# Patient Record
Sex: MALE | Race: WHITE | NOT HISPANIC OR LATINO | Employment: OTHER | URBAN - METROPOLITAN AREA
[De-identification: names, ages, dates, MRNs, and addresses within clinical notes are randomized per-mention and may not be internally consistent; named-entity substitution may affect disease eponyms.]

---

## 2017-01-03 RX ORDER — PANTOPRAZOLE SODIUM 40 MG/1
TABLET, DELAYED RELEASE ORAL
Qty: 90 TABLET | Refills: 0 | Status: SHIPPED | OUTPATIENT
Start: 2017-01-03 | End: 2017-07-25 | Stop reason: SDUPTHER

## 2017-01-30 ENCOUNTER — OFFICE VISIT (OUTPATIENT)
Dept: GASTROENTEROLOGY | Facility: CLINIC | Age: 62
End: 2017-01-30

## 2017-01-30 VITALS
BODY MASS INDEX: 30.38 KG/M2 | WEIGHT: 212.2 LBS | SYSTOLIC BLOOD PRESSURE: 130 MMHG | HEIGHT: 70 IN | DIASTOLIC BLOOD PRESSURE: 82 MMHG

## 2017-01-30 DIAGNOSIS — K63.5 COLON POLYPS: ICD-10-CM

## 2017-01-30 DIAGNOSIS — Z80.0 FAMILY HISTORY OF GI MALIGNANCY: ICD-10-CM

## 2017-01-30 DIAGNOSIS — K21.9 GASTROESOPHAGEAL REFLUX DISEASE, ESOPHAGITIS PRESENCE NOT SPECIFIED: Primary | ICD-10-CM

## 2017-01-30 PROCEDURE — 99213 OFFICE O/P EST LOW 20 MIN: CPT | Performed by: INTERNAL MEDICINE

## 2017-01-30 RX ORDER — SODIUM CHLORIDE 0.9 % (FLUSH) 0.9 %
1-10 SYRINGE (ML) INJECTION AS NEEDED
Status: CANCELLED | OUTPATIENT
Start: 2017-01-30

## 2017-01-30 NOTE — MR AVS SNAPSHOT
Dangelo Song   1/30/2017 1:00 PM   Office Visit    Dept Phone:  [phone]   Encounter #:  [id number]    Provider:  Dangelo WHYTE MD   Department:  [facility] GASTROENTEROLOGY                Your Full Care Plan              Today's Medication Changes          These changes are accurate as of: 1/30/17  1:45 PM.  If you have any questions, ask your nurse or doctor.               Stop taking medication(s)listed here:     albuterol 108 (90 BASE) MCG/ACT inhaler   Commonly known as:  PROAIR RESPICLICK   Stopped by:  Dangelo WHYTE MD           benzonatate 100 MG capsule   Commonly known as:  TESSALON PERLES   Stopped by:  Dangelo WHYTE MD           levoFLOXacin 500 MG tablet   Commonly known as:  LEVAQUIN   Stopped by:  Dangelo WHYTE MD           predniSONE 10 MG tablet   Commonly known as:  DELTASONE   Stopped by:  Dangelo WHYTE MD                      Your Updated Medication List          This list is accurate as of: 1/30/17  1:45 PM.  Always use your most recent med list.                EPIPEN 2-CONSUELO 0.3 MG/0.3ML solution auto-injector injection   Generic drug:  EPINEPHrine       HYDROcodone-acetaminophen 5-325 MG per tablet   Commonly known as:  NORCO       lisinopril 5 MG tablet   Commonly known as:  PRINIVIL,ZESTRIL       pantoprazole 40 MG EC tablet   Commonly known as:  PROTONIX   Take 1 tablet by mouth  every day               You Were Diagnosed With        Codes Comments    Gastroesophageal reflux disease, esophagitis presence not specified    -  Primary ICD-10-CM: K21.9  ICD-9-CM: 530.81     Colon polyps     ICD-10-CM: K63.5  ICD-9-CM: 211.3     Family history of GI malignancy     ICD-10-CM: Z80.0  ICD-9-CM: V16.0       Instructions     None    Patient Instructions History      Upcoming Appointments     Visit Type Date Time Department    FOLLOW UP 1/30/2017  1:00 PM [facility]      MyChart Signup     [facility]  "allows you to send messages to your doctor, view your test results, renew your prescriptions, schedule appointments, and more. To sign up, go to [email or web address] and click on the Sign Up Now link in the New User? box. Enter your [employer] Activation Code exactly as it appears below along with the last four digits of your Social Security Number and your Date of Birth () to complete the sign-up process. If you do not sign up before the expiration date, you must request a new code.    [employer] Activation Code: [email or web address]  Expires: 2017  1:43 PM    If you have questions, you can email [email or web address] or call [phone] to talk to our [employer] staff. Remember, [employer] is NOT to be used for urgent needs. For medical emergencies, dial 911.               Other Info from Your Visit           Allergies     Bee Venom        Reason for Visit     Follow-up yearly,med refill,scopes      Vital Signs     Blood Pressure Height Weight Body Mass Index Smoking Status       130/82 70\" (177.8 cm) 212 lb 3.2 oz (96.3 kg) 30.45 kg/m2 Former Smoker       Problems and Diagnoses Noted     Acid reflux disease    -  Primary    Colon polyp        Family history of GI malignancy            "

## 2017-01-30 NOTE — PROGRESS NOTES
Chief Complaint   Patient presents with   • Follow-up     yearly,med refill,scopes        Dangelo Song is a  61 y.o. male here for a follow up visit for GERD, history of polyps, family history of colon cancer    HPI this 61-year-old white male returns in follow-up since his last visit of September 2015.  He was seen at that time for reflux issues and had been advised to call in follow-up to schedule upper endoscopy as his last study at been in 2012.  He also had undergone colonoscopy at that same time in 2012 with evidence of a hyperplastic polyp.  He is due for follow-up colonoscopy this year and has been advised to consider both upper and lower examination.  He reports problems with his left knee and is considering knee replacement in the near future.  I advised him to discuss this with his orthopedist as to the timing of his endoscopies and the need for any prophylactic antibiotic regimen.  His GI function is intact.  Pantoprazole controls his reflux.  Bowel pattern is usually 2-3 formed stools per day without any melena or bright red blood per rectum.  He does have a remote family history of colon cancer involving a cousin and had evidence of adenomatous polyps in the past.    Past Medical History   Diagnosis Date   • Acid reflux    • Hypertension        Current Outpatient Prescriptions   Medication Sig Dispense Refill   • EPIPEN 2-CONSUELO 0.3 MG/0.3ML solution auto-injector injection TAKE BY MOUTH AS DIRECTED  0   • HYDROcodone-acetaminophen (NORCO) 5-325 MG per tablet TAKE ONE TO TWO TABLETS BY MOUTH EVERY 6 TO 8 HOURS AS NEEDED FOR PAIN  0   • lisinopril (PRINIVIL,ZESTRIL) 5 MG tablet      • pantoprazole (PROTONIX) 40 MG EC tablet Take 1 tablet by mouth  every day 90 tablet 0     No current facility-administered medications for this visit.        PRN Meds:.    Allergies   Allergen Reactions   • Bee Venom        Social History     Social History   • Marital status:      Spouse name: N/A   • Number of  children: N/A   • Years of education: N/A     Occupational History   • Not on file.     Social History Main Topics   • Smoking status: Former Smoker   • Smokeless tobacco: Not on file   • Alcohol use Yes      Comment: occ.   • Drug use: Not on file   • Sexual activity: Not on file     Other Topics Concern   • Not on file     Social History Narrative   • No narrative on file       History reviewed. No pertinent family history.    Review of Systems   Constitutional: Negative for activity change, appetite change, fatigue and unexpected weight change.   HENT: Negative for congestion, facial swelling, sore throat, trouble swallowing and voice change.    Eyes: Negative for photophobia and visual disturbance.   Respiratory: Negative for cough and choking.    Cardiovascular: Negative for chest pain.   Gastrointestinal: Negative for abdominal distention, abdominal pain, anal bleeding, blood in stool, constipation, diarrhea, nausea, rectal pain and vomiting.   Endocrine: Negative for polyphagia.   Musculoskeletal: Negative for arthralgias, gait problem and joint swelling.        Left knee pain   Skin: Negative for color change, pallor and rash.   Allergic/Immunologic: Negative for food allergies.   Neurological: Negative for speech difficulty and headaches.   Hematological: Does not bruise/bleed easily.   Psychiatric/Behavioral: Negative for agitation, confusion and sleep disturbance.       Vitals:    01/30/17 1259   BP: 130/82       Physical Exam   Constitutional: He is oriented to person, place, and time. He appears well-developed and well-nourished.   HENT:   Head: Normocephalic.   Mouth/Throat: Oropharynx is clear and moist.   Eyes: Conjunctivae and EOM are normal.   Neck: Normal range of motion.   Cardiovascular: Normal rate and regular rhythm.    Pulmonary/Chest: Breath sounds normal.   Abdominal: Soft. Bowel sounds are normal.   Musculoskeletal: Normal range of motion.   Neurological: He is alert and oriented to  person, place, and time.   Skin: Skin is warm and dry.   Psychiatric: He has a normal mood and affect. His behavior is normal.       ASSESSMENT   #1 GERD: Stable on PPI  #2 remote family history of colon cancer  #3 personal history of colon polyps      PLAN  Offer EGD and colonoscopy  Patient to discuss timing with his orthopedist in view of his upcoming knee surgery  Continue current medications      ICD-10-CM ICD-9-CM   1. Gastroesophageal reflux disease, esophagitis presence not specified K21.9 530.81   2. Colon polyps K63.5 211.3   3. Family history of GI malignancy Z80.0 V16.0

## 2017-02-27 ENCOUNTER — HOSPITAL ENCOUNTER (OUTPATIENT)
Dept: CARDIOLOGY | Facility: HOSPITAL | Age: 62
Discharge: HOME | End: 2017-02-27
Attending: ORTHOPAEDIC SURGERY

## 2017-02-27 ENCOUNTER — TRANSCRIBE ORDERS (OUTPATIENT)
Dept: LAB | Facility: HOSPITAL | Age: 62
End: 2017-02-27

## 2017-02-27 ENCOUNTER — HOSPITAL ENCOUNTER (OUTPATIENT)
Dept: GENERAL RADIOLOGY | Facility: HOSPITAL | Age: 62
Discharge: HOME | End: 2017-02-27
Attending: ORTHOPAEDIC SURGERY | Admitting: ORTHOPAEDIC SURGERY

## 2017-02-27 ENCOUNTER — LAB (OUTPATIENT)
Dept: LAB | Facility: HOSPITAL | Age: 62
End: 2017-02-27
Attending: ORTHOPAEDIC SURGERY

## 2017-02-27 DIAGNOSIS — M25.562 LEFT KNEE PAIN, UNSPECIFIED CHRONICITY: ICD-10-CM

## 2017-02-27 DIAGNOSIS — M17.12 ARTHRITIS OF LEFT KNEE: ICD-10-CM

## 2017-02-27 DIAGNOSIS — M17.12 ARTHRITIS OF LEFT KNEE: Primary | ICD-10-CM

## 2017-02-27 DIAGNOSIS — M17.10 ARTHRITIS OF KNEE: ICD-10-CM

## 2017-02-27 LAB
ALBUMIN SERPL-MCNC: 4.6 G/DL (ref 3.5–5.2)
ALBUMIN/GLOB SERPL: 1.6 G/DL
ALP SERPL-CCNC: 54 U/L (ref 39–117)
ALT SERPL W P-5'-P-CCNC: 27 U/L (ref 1–41)
ANION GAP SERPL CALCULATED.3IONS-SCNC: 12.1 MMOL/L
APTT PPP: 31.7 SECONDS (ref 22.7–35.4)
AST SERPL-CCNC: 19 U/L (ref 1–40)
BASOPHILS # BLD AUTO: 0.02 10*3/MM3 (ref 0–0.2)
BASOPHILS NFR BLD AUTO: 0.2 % (ref 0–1.5)
BILIRUB SERPL-MCNC: 0.4 MG/DL (ref 0.1–1.2)
BILIRUB UR QL STRIP: NEGATIVE
BUN BLD-MCNC: 18 MG/DL (ref 8–23)
BUN/CREAT SERPL: 16.8 (ref 7–25)
CALCIUM SPEC-SCNC: 9.3 MG/DL (ref 8.6–10.5)
CHLORIDE SERPL-SCNC: 102 MMOL/L (ref 98–107)
CLARITY UR: CLEAR
CO2 SERPL-SCNC: 27.9 MMOL/L (ref 22–29)
COLOR UR: YELLOW
CREAT BLD-MCNC: 1.07 MG/DL (ref 0.76–1.27)
DEPRECATED RDW RBC AUTO: 40.5 FL (ref 37–54)
EOSINOPHIL # BLD AUTO: 0.04 10*3/MM3 (ref 0–0.7)
EOSINOPHIL NFR BLD AUTO: 0.4 % (ref 0.3–6.2)
ERYTHROCYTE [DISTWIDTH] IN BLOOD BY AUTOMATED COUNT: 12.6 % (ref 11.5–14.5)
GFR SERPL CREATININE-BSD FRML MDRD: 70 ML/MIN/1.73
GLOBULIN UR ELPH-MCNC: 2.8 GM/DL
GLUCOSE BLD-MCNC: 110 MG/DL (ref 65–99)
GLUCOSE UR STRIP-MCNC: NEGATIVE MG/DL
HCT VFR BLD AUTO: 45.5 % (ref 40.4–52.2)
HGB BLD-MCNC: 15.4 G/DL (ref 13.7–17.6)
HGB UR QL STRIP.AUTO: NEGATIVE
IMM GRANULOCYTES # BLD: 0.02 10*3/MM3 (ref 0–0.03)
IMM GRANULOCYTES NFR BLD: 0.2 % (ref 0–0.5)
KETONES UR QL STRIP: NEGATIVE
LEUKOCYTE ESTERASE UR QL STRIP.AUTO: NEGATIVE
LYMPHOCYTES # BLD AUTO: 1.8 10*3/MM3 (ref 0.9–4.8)
LYMPHOCYTES NFR BLD AUTO: 18.7 % (ref 19.6–45.3)
MCH RBC QN AUTO: 30 PG (ref 27–32.7)
MCHC RBC AUTO-ENTMCNC: 33.8 G/DL (ref 32.6–36.4)
MCV RBC AUTO: 88.7 FL (ref 79.8–96.2)
MONOCYTES # BLD AUTO: 0.73 10*3/MM3 (ref 0.2–1.2)
MONOCYTES NFR BLD AUTO: 7.6 % (ref 5–12)
NEUTROPHILS # BLD AUTO: 7.03 10*3/MM3 (ref 1.9–8.1)
NEUTROPHILS NFR BLD AUTO: 72.9 % (ref 42.7–76)
NITRITE UR QL STRIP: NEGATIVE
PH UR STRIP.AUTO: 5.5 [PH] (ref 5–8)
PLATELET # BLD AUTO: 236 10*3/MM3 (ref 140–500)
PMV BLD AUTO: 10.1 FL (ref 6–12)
POTASSIUM BLD-SCNC: 4.2 MMOL/L (ref 3.5–5.2)
PROT SERPL-MCNC: 7.4 G/DL (ref 6–8.5)
PROT UR QL STRIP: NEGATIVE
RBC # BLD AUTO: 5.13 10*6/MM3 (ref 4.6–6)
SODIUM BLD-SCNC: 142 MMOL/L (ref 136–145)
SP GR UR STRIP: 1.01 (ref 1–1.03)
UROBILINOGEN UR QL STRIP: NORMAL
WBC NRBC COR # BLD: 9.64 10*3/MM3 (ref 4.5–10.7)

## 2017-02-27 PROCEDURE — 71020 HC CHEST PA AND LATERAL: CPT

## 2017-02-27 PROCEDURE — 85730 THROMBOPLASTIN TIME PARTIAL: CPT

## 2017-02-27 PROCEDURE — 81003 URINALYSIS AUTO W/O SCOPE: CPT

## 2017-02-27 PROCEDURE — 93010 ELECTROCARDIOGRAM REPORT: CPT | Performed by: INTERNAL MEDICINE

## 2017-02-27 PROCEDURE — 85025 COMPLETE CBC W/AUTO DIFF WBC: CPT

## 2017-02-27 PROCEDURE — 93005 ELECTROCARDIOGRAM TRACING: CPT | Performed by: ORTHOPAEDIC SURGERY

## 2017-02-27 PROCEDURE — 80053 COMPREHEN METABOLIC PANEL: CPT

## 2017-02-27 PROCEDURE — 36415 COLL VENOUS BLD VENIPUNCTURE: CPT

## 2017-07-25 RX ORDER — PANTOPRAZOLE SODIUM 40 MG/1
TABLET, DELAYED RELEASE ORAL
Qty: 90 TABLET | Refills: 0 | Status: SHIPPED | OUTPATIENT
Start: 2017-07-25 | End: 2017-10-13 | Stop reason: SDUPTHER

## 2017-10-15 RX ORDER — PANTOPRAZOLE SODIUM 40 MG/1
TABLET, DELAYED RELEASE ORAL
Qty: 90 TABLET | Refills: 3 | Status: SHIPPED | OUTPATIENT
Start: 2017-10-15 | End: 2018-09-16 | Stop reason: SDUPTHER

## 2017-11-13 ENCOUNTER — TRANSCRIBE ORDERS (OUTPATIENT)
Dept: GASTROENTEROLOGY | Facility: CLINIC | Age: 62
End: 2017-11-13

## 2017-11-13 DIAGNOSIS — K22.70 BARRETT'S ESOPHAGUS WITHOUT DYSPLASIA: ICD-10-CM

## 2017-11-13 DIAGNOSIS — Z86.010 HX OF COLONIC POLYPS: Primary | ICD-10-CM

## 2017-12-21 ENCOUNTER — OUTSIDE FACILITY SERVICE (OUTPATIENT)
Dept: GASTROENTEROLOGY | Facility: CLINIC | Age: 62
End: 2017-12-21

## 2017-12-21 PROCEDURE — 43239 EGD BIOPSY SINGLE/MULTIPLE: CPT | Performed by: INTERNAL MEDICINE

## 2017-12-21 PROCEDURE — 45378 DIAGNOSTIC COLONOSCOPY: CPT | Performed by: INTERNAL MEDICINE

## 2017-12-28 ENCOUNTER — TELEPHONE (OUTPATIENT)
Dept: GASTROENTEROLOGY | Facility: CLINIC | Age: 62
End: 2017-12-28

## 2018-01-02 NOTE — TELEPHONE ENCOUNTER
Would recommend patient continue PPI to address his reflux, would follow-up in office annually or call sooner as needed.

## 2018-01-03 NOTE — TELEPHONE ENCOUNTER
Call to pt.  Advise per Dr Griffin that would recommend continue PPI to address reflux.  F/u in office annually or call sooner as needed.  Pt verb understanding.    O/v for 12/21/18 placed in recall.

## 2018-09-17 RX ORDER — PANTOPRAZOLE SODIUM 40 MG/1
TABLET, DELAYED RELEASE ORAL
Qty: 90 TABLET | Refills: 3 | Status: SHIPPED | OUTPATIENT
Start: 2018-09-17 | End: 2019-08-09 | Stop reason: SDUPTHER

## 2019-04-29 ENCOUNTER — TRANSCRIBE ORDERS (OUTPATIENT)
Dept: CARDIOLOGY | Facility: HOSPITAL | Age: 64
End: 2019-04-29

## 2019-04-29 ENCOUNTER — HOSPITAL ENCOUNTER (OUTPATIENT)
Dept: CARDIOLOGY | Facility: HOSPITAL | Age: 64
Discharge: HOME | End: 2019-04-29
Admitting: ORTHOPAEDIC SURGERY

## 2019-04-29 ENCOUNTER — LAB (OUTPATIENT)
Dept: LAB | Facility: HOSPITAL | Age: 64
End: 2019-04-29

## 2019-04-29 DIAGNOSIS — Z01.818 PRE-OP EXAM: ICD-10-CM

## 2019-04-29 DIAGNOSIS — Z01.818 PRE-OP EXAM: Primary | ICD-10-CM

## 2019-04-29 DIAGNOSIS — M25.561 RIGHT KNEE PAIN, UNSPECIFIED CHRONICITY: ICD-10-CM

## 2019-04-29 LAB
ALBUMIN SERPL-MCNC: 4.4 G/DL (ref 3.5–5.2)
ALBUMIN/GLOB SERPL: 1.8 G/DL
ALP SERPL-CCNC: 47 U/L (ref 39–117)
ALT SERPL W P-5'-P-CCNC: 31 U/L (ref 1–41)
ANION GAP SERPL CALCULATED.3IONS-SCNC: 11.4 MMOL/L
AST SERPL-CCNC: 30 U/L (ref 1–40)
BASOPHILS # BLD AUTO: 0.03 10*3/MM3 (ref 0–0.2)
BASOPHILS NFR BLD AUTO: 0.4 % (ref 0–1.5)
BILIRUB SERPL-MCNC: 0.7 MG/DL (ref 0.2–1.2)
BUN BLD-MCNC: 14 MG/DL (ref 8–23)
BUN/CREAT SERPL: 13.3 (ref 7–25)
CALCIUM SPEC-SCNC: 8.9 MG/DL (ref 8.6–10.5)
CHLORIDE SERPL-SCNC: 104 MMOL/L (ref 98–107)
CO2 SERPL-SCNC: 26.6 MMOL/L (ref 22–29)
CREAT BLD-MCNC: 1.05 MG/DL (ref 0.76–1.27)
DEPRECATED RDW RBC AUTO: 41.9 FL (ref 37–54)
EOSINOPHIL # BLD AUTO: 0.05 10*3/MM3 (ref 0–0.4)
EOSINOPHIL NFR BLD AUTO: 0.6 % (ref 0.3–6.2)
ERYTHROCYTE [DISTWIDTH] IN BLOOD BY AUTOMATED COUNT: 12.7 % (ref 12.3–15.4)
GFR SERPL CREATININE-BSD FRML MDRD: 72 ML/MIN/1.73
GLOBULIN UR ELPH-MCNC: 2.4 GM/DL
GLUCOSE BLD-MCNC: 162 MG/DL (ref 65–99)
HCT VFR BLD AUTO: 43.7 % (ref 37.5–51)
HGB BLD-MCNC: 14.6 G/DL (ref 13–17.7)
IMM GRANULOCYTES # BLD AUTO: 0.04 10*3/MM3 (ref 0–0.05)
IMM GRANULOCYTES NFR BLD AUTO: 0.5 % (ref 0–0.5)
LYMPHOCYTES # BLD AUTO: 1.11 10*3/MM3 (ref 0.7–3.1)
LYMPHOCYTES NFR BLD AUTO: 13.7 % (ref 19.6–45.3)
MCH RBC QN AUTO: 30.1 PG (ref 26.6–33)
MCHC RBC AUTO-ENTMCNC: 33.4 G/DL (ref 31.5–35.7)
MCV RBC AUTO: 90.1 FL (ref 79–97)
MONOCYTES # BLD AUTO: 0.57 10*3/MM3 (ref 0.1–0.9)
MONOCYTES NFR BLD AUTO: 7 % (ref 5–12)
NEUTROPHILS # BLD AUTO: 6.29 10*3/MM3 (ref 1.7–7)
NEUTROPHILS NFR BLD AUTO: 77.8 % (ref 42.7–76)
NRBC BLD AUTO-RTO: 0 /100 WBC (ref 0–0.2)
PLATELET # BLD AUTO: 171 10*3/MM3 (ref 140–450)
PMV BLD AUTO: 10.5 FL (ref 6–12)
POTASSIUM BLD-SCNC: 3.9 MMOL/L (ref 3.5–5.2)
PROT SERPL-MCNC: 6.8 G/DL (ref 6–8.5)
RBC # BLD AUTO: 4.85 10*6/MM3 (ref 4.14–5.8)
SODIUM BLD-SCNC: 142 MMOL/L (ref 136–145)
WBC NRBC COR # BLD: 8.09 10*3/MM3 (ref 3.4–10.8)

## 2019-04-29 PROCEDURE — 93010 ELECTROCARDIOGRAM REPORT: CPT | Performed by: INTERNAL MEDICINE

## 2019-04-29 PROCEDURE — 36415 COLL VENOUS BLD VENIPUNCTURE: CPT

## 2019-04-29 PROCEDURE — 80053 COMPREHEN METABOLIC PANEL: CPT

## 2019-04-29 PROCEDURE — 85025 COMPLETE CBC W/AUTO DIFF WBC: CPT

## 2019-04-29 PROCEDURE — 93005 ELECTROCARDIOGRAM TRACING: CPT | Performed by: ORTHOPAEDIC SURGERY

## 2019-08-11 RX ORDER — PANTOPRAZOLE SODIUM 40 MG/1
TABLET, DELAYED RELEASE ORAL
Qty: 90 TABLET | Refills: 3 | Status: SHIPPED | OUTPATIENT
Start: 2019-08-11 | End: 2020-06-17

## 2019-09-13 ENCOUNTER — OFFICE VISIT (OUTPATIENT)
Dept: GASTROENTEROLOGY | Facility: CLINIC | Age: 64
End: 2019-09-13

## 2019-09-13 VITALS
TEMPERATURE: 98.1 F | WEIGHT: 221.2 LBS | HEIGHT: 70 IN | BODY MASS INDEX: 31.67 KG/M2 | DIASTOLIC BLOOD PRESSURE: 82 MMHG | SYSTOLIC BLOOD PRESSURE: 134 MMHG

## 2019-09-13 DIAGNOSIS — R15.2 INCONTINENCE OF FECES WITH FECAL URGENCY: ICD-10-CM

## 2019-09-13 DIAGNOSIS — R10.12 LEFT UPPER QUADRANT PAIN: Primary | ICD-10-CM

## 2019-09-13 DIAGNOSIS — R19.7 DIARRHEA OF PRESUMED INFECTIOUS ORIGIN: ICD-10-CM

## 2019-09-13 DIAGNOSIS — R15.9 INCONTINENCE OF FECES WITH FECAL URGENCY: ICD-10-CM

## 2019-09-13 DIAGNOSIS — K21.9 GASTROESOPHAGEAL REFLUX DISEASE, ESOPHAGITIS PRESENCE NOT SPECIFIED: ICD-10-CM

## 2019-09-13 PROCEDURE — 99214 OFFICE O/P EST MOD 30 MIN: CPT | Performed by: NURSE PRACTITIONER

## 2019-09-13 RX ORDER — CIPROFLOXACIN 500 MG/1
TABLET, FILM COATED ORAL
Refills: 0 | COMMUNITY
Start: 2019-08-30 | End: 2019-10-16

## 2019-09-13 RX ORDER — ALPRAZOLAM 0.25 MG/1
TABLET ORAL
Refills: 0 | COMMUNITY
Start: 2019-08-23

## 2019-09-13 RX ORDER — RANITIDINE 150 MG/1
CAPSULE ORAL
Refills: 5 | COMMUNITY
Start: 2019-08-30 | End: 2019-10-16

## 2019-09-13 RX ORDER — SACCHAROMYCES BOULARDII 250 MG
250 CAPSULE ORAL 2 TIMES DAILY
COMMUNITY

## 2019-09-13 RX ORDER — TESTOSTERONE CYPIONATE 200 MG/ML
INJECTION, SOLUTION INTRAMUSCULAR
Refills: 0 | COMMUNITY
Start: 2019-08-06

## 2019-09-13 RX ORDER — LISINOPRIL AND HYDROCHLOROTHIAZIDE 12.5; 1 MG/1; MG/1
1 TABLET ORAL DAILY
COMMUNITY

## 2019-09-13 NOTE — PROGRESS NOTES
Chief Complaint   Patient presents with   • Abdominal Pain   • Diarrhea       Dangelo Song is a  63 y.o. male here for a follow up visit for abdominal pain and diarrhea.    HPI  63-year-old male presents today for follow-up visit for abdominal pain with diarrhea, nausea and fecal urgency.  He is a patient of Dr. Griffin.  He was last seen in the office on 1/30/2017.  He reports recently has been feeling terrible with abdominal pain, diarrhea, fecal urgency and nausea.  He admits he went to his primary care office And was given some Cipro and had labs and stool studies done he was told that the labs were negative and the stool studies were negative as well.  He has started on a probiotic recently.  Other than feeling a little bit of fatigue he tells me everything else is much better now.  He denies any reflux, dysphagia, abdominal pain, nausea and vomiting, diarrhea, constipation, rectal bleeding or melena.  Admits appetite is good and his weight is stable.  His last EGD and colonoscopy was done on 12/28/2017.  He does have a history of hyperplastic polyps.  Past Medical History:   Diagnosis Date   • Acid reflux    • Hypertension        Past Surgical History:   Procedure Laterality Date   • COLONOSCOPY  05/14/2012    ih,eh,sig tics,hp polyp   • COLONOSCOPY  12/28/2017    NTEH, tics, torts, IH   • ENDOSCOPY  05/14/2012    tort,hh,bilious gastric fluid,gastritis,zline irreg   • KNEE SURGERY     • UPPER GASTROINTESTINAL ENDOSCOPY  12/28/2017    gastritis   • WRIST SURGERY         Scheduled Meds:    Continuous Infusions:  No current facility-administered medications for this visit.     PRN Meds:.    Allergies   Allergen Reactions   • Bee Venom        Social History     Socioeconomic History   • Marital status:      Spouse name: Not on file   • Number of children: Not on file   • Years of education: Not on file   • Highest education level: Not on file   Tobacco Use   • Smoking status: Former Smoker     Types:  Cigarettes     Last attempt to quit:      Years since quittin.7   • Smokeless tobacco: Never Used   Substance and Sexual Activity   • Alcohol use: Yes     Comment: social   • Drug use: No       Family History   Problem Relation Age of Onset   • Breast cancer Mother    • Heart disease Father    • Colon cancer Cousin        Review of Systems   Constitutional: Negative for appetite change, chills, diaphoresis, fatigue, fever and unexpected weight change.   HENT: Negative for nosebleeds, postnasal drip, sore throat, trouble swallowing and voice change.    Respiratory: Negative for cough, choking, chest tightness, shortness of breath and wheezing.    Cardiovascular: Negative for chest pain, palpitations and leg swelling.   Gastrointestinal: Negative for abdominal distention, abdominal pain, anal bleeding, blood in stool, constipation, diarrhea, nausea, rectal pain and vomiting.   Endocrine: Negative for polydipsia, polyphagia and polyuria.   Musculoskeletal: Negative for gait problem.   Skin: Negative for rash and wound.   Allergic/Immunologic: Negative for food allergies.   Neurological: Negative for dizziness, speech difficulty and light-headedness.   Psychiatric/Behavioral: Negative for confusion, self-injury, sleep disturbance and suicidal ideas.       Vitals:    19 1507   BP: 134/82   Temp: 98.1 °F (36.7 °C)       Physical Exam   Constitutional: He is oriented to person, place, and time. He appears well-developed and well-nourished. He does not appear ill. No distress.   HENT:   Head: Normocephalic.   Eyes: Pupils are equal, round, and reactive to light.   Cardiovascular: Normal rate, regular rhythm and normal heart sounds.   Pulmonary/Chest: Effort normal and breath sounds normal.   Abdominal: Soft. Bowel sounds are normal. He exhibits no distension and no mass. There is no hepatosplenomegaly. There is no tenderness. There is no rebound and no guarding. No hernia.   Musculoskeletal: Normal range of  motion.   Neurological: He is alert and oriented to person, place, and time.   Skin: Skin is warm and dry.   Psychiatric: He has a normal mood and affect. His speech is normal and behavior is normal. Judgment normal.       No images are attached to the encounter.    Assessment and plan    1. Left upper quadrant pain    2. Diarrhea of presumed infectious origin    3. Incontinence of feces with fecal urgency    4. Gastroesophageal reflux disease, esophagitis presence not specified        I am not sure what is causing his symptoms at this time.  What ever it was seems to be much improved on the Cipro.  Patient to continue Cipro for now.  Patient to continue the Florastor.  Will obtain records from Dr. Peck's office to review.  Offered patient a CT scan of his abdomen and pelvis to further evaluate but at this time patient wants to wait given that his symptoms are improving and his insurance was recently changed.  Patient to call the office next week for an update patient to follow-up with me or Dr. Griffin in 3 to 4 weeks.  Patient to continue a bland diet.  GERD seems well controlled on the Protonix 40 mg once daily.  Its okay for him to add ranitidine at bedtime.  Continue GERD precautions.

## 2019-09-16 ENCOUNTER — TELEPHONE (OUTPATIENT)
Dept: GASTROENTEROLOGY | Facility: CLINIC | Age: 64
End: 2019-09-16

## 2019-09-16 NOTE — TELEPHONE ENCOUNTER
Call to Dr Thelma Corado's office @ [phone].   states has changed to DR Terry Hatch - updated in epic.    Call to that office @ [phone] and spoke with Sadi.  Request recent labs/stool studies be faxed to [phone].

## 2019-09-16 NOTE — TELEPHONE ENCOUNTER
----- Message from KANIKA Armenta sent at 9/13/2019  4:05 PM EDT -----  Please obtain records (labs and stool studies) from his PCP/DR. Hatch so we can review them. Thanks.

## 2019-09-16 NOTE — TELEPHONE ENCOUNTER
I reviewed stool study results from his primary which were all negative.  Please call the patient and let him know I reviewed everything and I just want to know how he was doing today.  Thanks

## 2019-09-17 ENCOUNTER — TELEPHONE (OUTPATIENT)
Dept: GASTROENTEROLOGY | Facility: CLINIC | Age: 64
End: 2019-09-17

## 2019-09-17 NOTE — TELEPHONE ENCOUNTER
STATUS UPDATE-CALL BACK   Received: Today      Call patient   Message Contents   [facility]abel[facility]   Phone Number: [phone]     Called pt and pt reports that he feels like overall things are better but not 100% normal.  He is still having some gurgling in the stomach and diarrhea is still happening but not as much.  Today his stool is soft but not watery .  Also pt denies any urgency.  Advised will send update to Viola GAYLE.

## 2019-09-19 NOTE — TELEPHONE ENCOUNTER
Call to pt.  Advise per M Katie that overall sounds like pt improving.  F/u as planned.      Verb understanding.  Has appt with DR Griffin on 10/16.

## 2019-10-16 ENCOUNTER — OFFICE VISIT (OUTPATIENT)
Dept: GASTROENTEROLOGY | Facility: CLINIC | Age: 64
End: 2019-10-16

## 2019-10-16 VITALS
BODY MASS INDEX: 31.52 KG/M2 | TEMPERATURE: 98.5 F | WEIGHT: 220.2 LBS | DIASTOLIC BLOOD PRESSURE: 82 MMHG | SYSTOLIC BLOOD PRESSURE: 122 MMHG | HEIGHT: 70 IN

## 2019-10-16 DIAGNOSIS — R10.84 GENERALIZED ABDOMINAL PAIN: Primary | ICD-10-CM

## 2019-10-16 DIAGNOSIS — R19.7 DIARRHEA, UNSPECIFIED TYPE: ICD-10-CM

## 2019-10-16 DIAGNOSIS — K63.5 POLYP OF COLON, UNSPECIFIED PART OF COLON, UNSPECIFIED TYPE: ICD-10-CM

## 2019-10-16 PROCEDURE — 99214 OFFICE O/P EST MOD 30 MIN: CPT | Performed by: INTERNAL MEDICINE

## 2019-10-16 RX ORDER — DEXTROMETHORPHAN HYDROBROMIDE AND PROMETHAZINE HYDROCHLORIDE 15; 6.25 MG/5ML; MG/5ML
SYRUP ORAL
Refills: 1 | COMMUNITY
Start: 2019-10-09

## 2019-10-16 RX ORDER — METHYLPREDNISOLONE 4 MG/1
TABLET ORAL
Refills: 0 | COMMUNITY
Start: 2019-10-09

## 2019-10-16 RX ORDER — DOXYCYCLINE HYCLATE 100 MG/1
CAPSULE ORAL
Refills: 0 | COMMUNITY
Start: 2019-10-09

## 2019-10-16 NOTE — PROGRESS NOTES
Chief Complaint   Patient presents with   • Abdominal Pain        Dangelo Song is a  63 y.o. male here for a follow up visit for abdominal pain, diarrhea, GERD    HPI this 63-year-old white male patient of Dr. Terry Hatch returns in follow-up since last seen September 13.  He had experienced some significant abdominal pain, diarrhea, and fecal urgency prior to that visit.  He was treated with ciprofloxacin and underwent lab and stool studies which were reportedly negative.  He had shown marked improvement of his symptoms but states he still has some bouts of diarrhea.  We talked about further evaluation to include CT scan of the abdomen, further stool studies, or empiric symptomatic medications.  He would like to observe for another 2 to 4 weeks and will call with a progress report at that time.  If his symptoms do not lara then we will consider more formal assessment.    Past Medical History:   Diagnosis Date   • Acid reflux    • Hypertension        Current Outpatient Medications   Medication Sig Dispense Refill   • ALPRAZolam (XANAX) 0.25 MG tablet TK 1 T PO TID PRA  0   • doxycycline (VIBRAMYCIN) 100 MG capsule TK 1 C PO BID WC  0   • EPIPEN 2-CONSUELO 0.3 MG/0.3ML solution auto-injector injection TAKE BY MOUTH AS DIRECTED  0   • lisinopril-hydrochlorothiazide (PRINZIDE,ZESTORETIC) 10-12.5 MG per tablet Take 1 tablet by mouth Daily.     • methylPREDNISolone (MEDROL, CONSUELO,) 4 MG tablet FPD  0   • pantoprazole (PROTONIX) 40 MG EC tablet TAKE 1 TABLET BY MOUTH  EVERY DAY 90 tablet 3   • promethazine-dextromethorphan (PROMETHAZINE-DM) 6.25-15 MG/5ML syrup TK 5 TO 10 ML PO Q 6 H PRN  1   • saccharomyces boulardii (FLORASTOR) 250 MG capsule Take 250 mg by mouth 2 (Two) Times a Day.     • Testosterone Cypionate (DEPOTESTOTERONE CYPIONATE) 200 MG/ML injection INJECT 0.5ML ONCE WEEKLY  0     No current facility-administered medications for this visit.        PRN Meds:.    Allergies   Allergen Reactions   • Bee Venom         Social History     Socioeconomic History   • Marital status:      Spouse name: Not on file   • Number of children: Not on file   • Years of education: Not on file   • Highest education level: Not on file   Tobacco Use   • Smoking status: Former Smoker     Types: Cigarettes     Last attempt to quit:      Years since quittin.8   • Smokeless tobacco: Never Used   Substance and Sexual Activity   • Alcohol use: Yes     Comment: social   • Drug use: No       Family History   Problem Relation Age of Onset   • Breast cancer Mother    • Heart disease Father    • Colon cancer Cousin    • Cancer Cousin         prostate       Review of Systems   Constitutional: Negative for activity change, appetite change, fatigue and unexpected weight change.   HENT: Negative for congestion, facial swelling, sore throat, trouble swallowing and voice change.    Eyes: Negative for photophobia and visual disturbance.   Respiratory: Negative for cough and choking.    Cardiovascular: Negative for chest pain.   Gastrointestinal: Positive for abdominal pain and diarrhea. Negative for abdominal distention, anal bleeding, blood in stool, constipation, nausea, rectal pain and vomiting.   Endocrine: Negative for polyphagia.   Musculoskeletal: Negative for arthralgias, gait problem and joint swelling.   Skin: Negative for color change, pallor and rash.   Allergic/Immunologic: Negative for food allergies.   Neurological: Negative for speech difficulty and headaches.   Hematological: Does not bruise/bleed easily.   Psychiatric/Behavioral: Negative for agitation, confusion and sleep disturbance.       Vitals:    10/16/19 1418   BP: 122/82   Temp: 98.5 °F (36.9 °C)       Physical Exam   Constitutional: He is oriented to person, place, and time. He appears well-developed and well-nourished.   HENT:   Head: Normocephalic.   Mouth/Throat: Oropharynx is clear and moist.   Eyes: Conjunctivae and EOM are normal.   Neck: Normal range of  motion.   Cardiovascular: Normal rate and regular rhythm.   Pulmonary/Chest: Breath sounds normal.   Abdominal: Soft. Bowel sounds are normal.   Musculoskeletal: Normal range of motion.   Neurological: He is alert and oriented to person, place, and time.   Skin: Skin is warm and dry.   Psychiatric: He has a normal mood and affect. His behavior is normal.       ASSESSMENT   #1 abdominal pain: Subsiding  #2 diarrhea: Some residual effect  #3 history of polyps  #4 GERD      PLAN  Patient to observe for the next 2 to 4 weeks  If symptoms persist would offer CT scan of the abdomen and pelvis, GI panel with PCR.      ICD-10-CM ICD-9-CM   1. Generalized abdominal pain R10.84 789.07   2. Diarrhea, unspecified type R19.7 787.91   3. Polyp of colon, unspecified part of colon, unspecified type K63.5 211.3

## 2019-11-13 ENCOUNTER — TELEPHONE (OUTPATIENT)
Dept: GASTROENTEROLOGY | Facility: CLINIC | Age: 64
End: 2019-11-13

## 2019-11-13 ENCOUNTER — RESULTS ENCOUNTER (OUTPATIENT)
Dept: GASTROENTEROLOGY | Facility: CLINIC | Age: 64
End: 2019-11-13

## 2019-11-13 DIAGNOSIS — R19.7 DIARRHEA, UNSPECIFIED TYPE: ICD-10-CM

## 2019-11-13 DIAGNOSIS — R10.84 GENERALIZED ABDOMINAL PAIN: Primary | ICD-10-CM

## 2019-11-13 DIAGNOSIS — R10.84 GENERALIZED ABDOMINAL PAIN: ICD-10-CM

## 2019-11-13 NOTE — TELEPHONE ENCOUNTER
See o/v note of 10/16.    Call to pt.  States since seen above has had 3 episodes of severe stomach pain lasting up to 45 min.  Diarrhea has improved on probiotic, but does continue to have occasional problem.  Would like to proceed with testing.    Advise that [facility] will contact to arrange CT.   stool kit at .  Verb understanding.     Order placed for CT abd/pelvis and GI PCR - message to Dr Griffin.

## 2019-11-13 NOTE — TELEPHONE ENCOUNTER
----- Message from Samantha Garces Rep sent at 11/13/2019 10:43 AM EST -----  Regarding: update  Contact: [phone]  Pt says he is still not completely well. Would like to talk to nurse.

## 2019-11-22 LAB
ADV 40+41 DNA STL QL NAA+NON-PROBE: NOT DETECTED
ASTRO TYP 1-8 RNA STL QL NAA+NON-PROBE: NOT DETECTED
C CAYETANENSIS DNA STL QL NAA+NON-PROBE: NOT DETECTED
C COLI+JEJ+UPSA DNA STL QL NAA+NON-PROBE: NOT DETECTED
C DIF TOX TCDA+TCDB STL QL NAA+NON-PROBE: NOT DETECTED
CRYPTOSP DNA STL QL NAA+NON-PROBE: NOT DETECTED
E COLI O157 DNA STL QL NAA+NON-PROBE: NORMAL
E HISTOLYT DNA STL QL NAA+NON-PROBE: NOT DETECTED
EAEC PAA PLAS AGGR+AATA ST NAA+NON-PRB: NOT DETECTED
EC STX1+STX2 GENES STL QL NAA+NON-PROBE: NOT DETECTED
EPEC EAE GENE STL QL NAA+NON-PROBE: NOT DETECTED
ETEC LTA+ST1A+ST1B TOX ST NAA+NON-PROBE: NOT DETECTED
G LAMBLIA DNA STL QL NAA+NON-PROBE: NOT DETECTED
NOROVIRUS GI+II RNA STL QL NAA+NON-PROBE: NOT DETECTED
P SHIGELLOIDES DNA STL QL NAA+NON-PROBE: NOT DETECTED
RVA RNA STL QL NAA+NON-PROBE: NOT DETECTED
S ENT+BONG DNA STL QL NAA+NON-PROBE: NOT DETECTED
SAPO I+II+IV+V RNA STL QL NAA+NON-PROBE: NOT DETECTED
SHIGELLA SP+EIEC IPAH ST NAA+NON-PROBE: NOT DETECTED
V CHOL+PARA+VUL DNA STL QL NAA+NON-PROBE: NOT DETECTED
V CHOLERAE DNA STL QL NAA+NON-PROBE: NOT DETECTED
Y ENTEROCOL DNA STL QL NAA+NON-PROBE: NOT DETECTED

## 2019-12-02 ENCOUNTER — TELEPHONE (OUTPATIENT)
Dept: GASTROENTEROLOGY | Facility: CLINIC | Age: 64
End: 2019-12-02

## 2019-12-02 NOTE — TELEPHONE ENCOUNTER
----- Message from Dangelo WHYTE MD sent at 11/26/2019  1:21 PM EST -----  Regarding: GI panel by PCR  Okay to call results, essentially negative.  ----- Message -----  From: Interface, Reflab Results In  Sent: 11/22/2019   7:09 AM  To: Dangelo WHYTE MD

## 2019-12-06 ENCOUNTER — HOSPITAL ENCOUNTER (OUTPATIENT)
Dept: CT IMAGING | Facility: HOSPITAL | Age: 64
Discharge: HOME | End: 2019-12-06
Admitting: INTERNAL MEDICINE

## 2019-12-06 DIAGNOSIS — R10.84 GENERALIZED ABDOMINAL PAIN: ICD-10-CM

## 2019-12-06 LAB — CREAT BLDA-MCNC: 1.2 MG/DL (ref 0.6–1.3)

## 2019-12-06 PROCEDURE — 25010000002 IOPAMIDOL 61 % SOLUTION: Performed by: INTERNAL MEDICINE

## 2019-12-06 PROCEDURE — 82565 ASSAY OF CREATININE: CPT

## 2019-12-06 PROCEDURE — 74177 CT ABD & PELVIS W/CONTRAST: CPT

## 2019-12-06 RX ADMIN — IOPAMIDOL 85 ML: 612 INJECTION, SOLUTION INTRAVENOUS at 09:08

## 2019-12-11 ENCOUNTER — TELEPHONE (OUTPATIENT)
Dept: GASTROENTEROLOGY | Facility: CLINIC | Age: 64
End: 2019-12-11

## 2019-12-11 DIAGNOSIS — R10.84 GENERALIZED ABDOMINAL PAIN: Primary | ICD-10-CM

## 2019-12-11 NOTE — TELEPHONE ENCOUNTER
----- Message from Dangelo WHYTE MD sent at 12/10/2019  8:43 AM EST -----  Regarding: CT scan results  Okay to call results to patient, with evidence of mild sigmoid colitis and possible diverticulitis, recommendations were made to consider endoscopic evaluation.  I would advise this as well.  He can schedule this at his convenience.  ----- Message -----  From: Interface, Rad Results [facility]ga In  Sent: 12/7/2019   9:22 PM EST  To: Dangelo WHYTE MD

## 2019-12-13 NOTE — TELEPHONE ENCOUNTER
Call to pt.  Advise per Dr Griffin that CT with evidence of mild sigmoid colitis and possible diverticulitis.  Recommendations were made to consider endoscopic eval.  Dr Griffin would advise this as well.  Can schedule at pt convenience.    Verb understanding and would like to proceed with endoscopy.    Case request placed - message to Dr Griffin.

## 2019-12-13 NOTE — TELEPHONE ENCOUNTER
Confer with Dr Griffin.  EGD cancelled.  C/s case request placed - message to Dr Griffin.     VM to pt - identifies as Joseph.  Advise that will be proceeding with c/s - NOT EGD.  Contact office if any questions.

## 2020-02-03 ENCOUNTER — TELEPHONE (OUTPATIENT)
Dept: GASTROENTEROLOGY | Facility: CLINIC | Age: 65
End: 2020-02-03

## 2020-02-03 NOTE — TELEPHONE ENCOUNTER
With findings on CT scan from early December of colitis/diverticulitis, and with his current symptoms, would entertain repeat CT scan as well as lab work (CBC) to see if he is still dealing with his same problem.  That being the case, he may warrant antibiotic treatment for diverticulitis prior to undergoing any endoscopic evaluation.  He may wish to discuss this as well with his primary care tender.

## 2020-02-03 NOTE — TELEPHONE ENCOUNTER
"Call to pt.  Reports since 1/27 having episode similar to what triggered CT.  However, thinks might be the flu.  Having \"violent diarrhea\" with a couple episodes of incontinence.  Taking pepto, therefore stools black. Not seeing any orquidea blood.  Does note that stools look oily.  Upper abd cramping.     Has felt feverish, but has not checked temp.    Poor appetite  - some nausea.  Has lost 10 lbs since 1/27.  Drinking fluids without difficulty.  Trying to gradually increase diet.  Feeling fatigued.  Upcoming c/s on 2/24.  Wants to update Dr Griffin with current symptoms.  Asking if should also notify PCP.      Advise will update DR Griffin and to also contact PCP  Verb understanding.       "

## 2020-02-03 NOTE — TELEPHONE ENCOUNTER
----- Message from Antonia Dodd sent at 2/3/2020  9:44 AM EST -----  Regarding: Question  Contact: [phone]  Pt wants to talk with a nurse

## 2020-02-10 NOTE — TELEPHONE ENCOUNTER
Call to pt.  Advise per Dr Griffin that with findings on CT from early Dec of colitis/diverticulitis, and withcurrent symptoms, would entertain repeat CT as well as lab work to see if still dealing with same problem.  That being the case, may warrant abx tx for diverticulitis prior to underoing any endoscopic eval.     Verb understanding.  States did see Dr Hatch and had lab work done.  CT discussed at that visit, but not ordered.  Pt states feeling better, eating ok, bowel pattern wnl.  Lengthy discussion re: Dr Griffin's recommendations.  Pt states will f/u with  Dr Hatch and proceed with c/s 2/24.  WIll call back as needed.    Update to DR Griffin.

## 2020-02-24 ENCOUNTER — OUTSIDE FACILITY SERVICE (OUTPATIENT)
Dept: GASTROENTEROLOGY | Facility: CLINIC | Age: 65
End: 2020-02-24

## 2020-02-24 PROCEDURE — 45380 COLONOSCOPY AND BIOPSY: CPT | Performed by: INTERNAL MEDICINE

## 2020-03-05 ENCOUNTER — TELEPHONE (OUTPATIENT)
Dept: GASTROENTEROLOGY | Facility: CLINIC | Age: 65
End: 2020-03-05

## 2020-03-05 NOTE — TELEPHONE ENCOUNTER
----- Message from Dangelo WHYTE MD sent at 3/2/2020  5:28 PM EST -----  Regarding: Biopsy results  Okay to call results, colon biopsies essentially negative.  Can offer further work-up in the terms of small bowel assessment to explain his chronic diarrhea i.e. hydrogen breath testing, celiac panel, bile acid assay (if these have not already been obtained).  ----- Message -----  From: Bonnie Lopez Incoming  Sent: 3/2/2020   1:46 PM EST  To: Dangelo WHYTE MD

## 2020-03-05 NOTE — TELEPHONE ENCOUNTER
Call to pt.  Advise per Dr Griffin note.  Verb understanding.  Declines further testing at this time - will call back as needed.

## 2020-03-23 ENCOUNTER — TELEPHONE (OUTPATIENT)
Dept: GASTROENTEROLOGY | Facility: CLINIC | Age: 65
End: 2020-03-23

## 2020-03-23 NOTE — TELEPHONE ENCOUNTER
"----- Message from Samantha Blanco Rep sent at 3/23/2020 10:46 AM EDT -----  Regarding: Insurance Question   Contact: [phone]  Pt stated his insurance denied stool study saying it was \"experiemental\" pt said he needs doctor to file an appeal.   "

## 2020-03-23 NOTE — TELEPHONE ENCOUNTER
Called pt and pt reports that Mehul is denying the gi pcr panel.  The cost is $700.  He is asking if we can appeal. Advised pt to contact his insurance company and get the appeal phone number and fax number and we can contact them .  Pt verb understanding.

## 2020-03-26 ENCOUNTER — TELEPHONE (OUTPATIENT)
Dept: GASTROENTEROLOGY | Facility: CLINIC | Age: 65
End: 2020-03-26

## 2020-03-26 NOTE — TELEPHONE ENCOUNTER
Call Back   Received: Today   Message Contents   Andreia Waite RegSched Rep  [facility] [address]   Phone Number: [phone]             Pt tried to get the contact information requested for his appeal. He was told that the office should go to the [facility] web-site and file a claim.      **Message to Hortencia Mg RN.

## 2020-06-17 RX ORDER — PANTOPRAZOLE SODIUM 40 MG/1
TABLET, DELAYED RELEASE ORAL
Qty: 90 TABLET | Refills: 3 | Status: SHIPPED | OUTPATIENT
Start: 2020-06-17 | End: 2021-05-11

## 2020-07-07 ENCOUNTER — HOSPITAL ENCOUNTER (OUTPATIENT)
Dept: CARDIOLOGY | Facility: HOSPITAL | Age: 65
Discharge: HOME | End: 2020-07-07

## 2020-07-07 ENCOUNTER — LAB (OUTPATIENT)
Dept: LAB | Facility: HOSPITAL | Age: 65
End: 2020-07-07

## 2020-07-07 ENCOUNTER — TRANSCRIBE ORDERS (OUTPATIENT)
Dept: CARDIOLOGY | Facility: HOSPITAL | Age: 65
End: 2020-07-07

## 2020-07-07 ENCOUNTER — TRANSCRIBE ORDERS (OUTPATIENT)
Dept: ADMINISTRATIVE | Facility: HOSPITAL | Age: 65
End: 2020-07-07

## 2020-07-07 ENCOUNTER — HOSPITAL ENCOUNTER (OUTPATIENT)
Dept: GENERAL RADIOLOGY | Facility: HOSPITAL | Age: 65
Discharge: HOME | End: 2020-07-07
Admitting: ORTHOPAEDIC SURGERY

## 2020-07-07 DIAGNOSIS — M25.50 ARTHRALGIA, UNSPECIFIED JOINT: Primary | ICD-10-CM

## 2020-07-07 DIAGNOSIS — Z01.811 PRE-OP CHEST EXAM: Primary | ICD-10-CM

## 2020-07-07 DIAGNOSIS — Z01.818 PREOP TESTING: ICD-10-CM

## 2020-07-07 DIAGNOSIS — M25.50 ARTHRALGIA, UNSPECIFIED JOINT: ICD-10-CM

## 2020-07-07 LAB
ALBUMIN SERPL-MCNC: 4.2 G/DL (ref 3.5–5.2)
ALBUMIN/GLOB SERPL: 1.7 G/DL
ALP SERPL-CCNC: 42 U/L (ref 39–117)
ALT SERPL W P-5'-P-CCNC: 24 U/L (ref 1–41)
ANION GAP SERPL CALCULATED.3IONS-SCNC: 9.3 MMOL/L (ref 5–15)
AST SERPL-CCNC: 20 U/L (ref 1–40)
BASOPHILS # BLD AUTO: 0.05 10*3/MM3 (ref 0–0.2)
BASOPHILS NFR BLD AUTO: 0.7 % (ref 0–1.5)
BILIRUB SERPL-MCNC: 0.9 MG/DL (ref 0–1.2)
BILIRUB UR QL STRIP: NEGATIVE
BUN SERPL-MCNC: 15 MG/DL (ref 8–23)
BUN/CREAT SERPL: 14 (ref 7–25)
CALCIUM SPEC-SCNC: 9.1 MG/DL (ref 8.6–10.5)
CHLORIDE SERPL-SCNC: 104 MMOL/L (ref 98–107)
CLARITY UR: CLEAR
CO2 SERPL-SCNC: 25.7 MMOL/L (ref 22–29)
COLOR UR: YELLOW
CREAT SERPL-MCNC: 1.07 MG/DL (ref 0.76–1.27)
DEPRECATED RDW RBC AUTO: 41.4 FL (ref 37–54)
EOSINOPHIL # BLD AUTO: 0.09 10*3/MM3 (ref 0–0.4)
EOSINOPHIL NFR BLD AUTO: 1.3 % (ref 0.3–6.2)
ERYTHROCYTE [DISTWIDTH] IN BLOOD BY AUTOMATED COUNT: 13.1 % (ref 12.3–15.4)
GFR SERPL CREATININE-BSD FRML MDRD: 70 ML/MIN/1.73
GLOBULIN UR ELPH-MCNC: 2.5 GM/DL
GLUCOSE SERPL-MCNC: 106 MG/DL (ref 65–99)
GLUCOSE UR STRIP-MCNC: NEGATIVE MG/DL
HCT VFR BLD AUTO: 46.1 % (ref 37.5–51)
HGB BLD-MCNC: 15.8 G/DL (ref 13–17.7)
HGB UR QL STRIP.AUTO: NEGATIVE
IMM GRANULOCYTES # BLD AUTO: 0.03 10*3/MM3 (ref 0–0.05)
IMM GRANULOCYTES NFR BLD AUTO: 0.4 % (ref 0–0.5)
INR PPP: 0.97 (ref 0.9–1.1)
KETONES UR QL STRIP: NEGATIVE
LEUKOCYTE ESTERASE UR QL STRIP.AUTO: NEGATIVE
LYMPHOCYTES # BLD AUTO: 1.9 10*3/MM3 (ref 0.7–3.1)
LYMPHOCYTES NFR BLD AUTO: 26.5 % (ref 19.6–45.3)
MCH RBC QN AUTO: 29.9 PG (ref 26.6–33)
MCHC RBC AUTO-ENTMCNC: 34.3 G/DL (ref 31.5–35.7)
MCV RBC AUTO: 87.3 FL (ref 79–97)
MONOCYTES # BLD AUTO: 0.61 10*3/MM3 (ref 0.1–0.9)
MONOCYTES NFR BLD AUTO: 8.5 % (ref 5–12)
NEUTROPHILS NFR BLD AUTO: 4.48 10*3/MM3 (ref 1.7–7)
NEUTROPHILS NFR BLD AUTO: 62.6 % (ref 42.7–76)
NITRITE UR QL STRIP: NEGATIVE
NRBC BLD AUTO-RTO: 0 /100 WBC (ref 0–0.2)
PH UR STRIP.AUTO: 5.5 [PH] (ref 5–8)
PLATELET # BLD AUTO: 188 10*3/MM3 (ref 140–450)
PMV BLD AUTO: 9.8 FL (ref 6–12)
POTASSIUM SERPL-SCNC: 3.9 MMOL/L (ref 3.5–5.2)
PROT SERPL-MCNC: 6.7 G/DL (ref 6–8.5)
PROT UR QL STRIP: NEGATIVE
PROTHROMBIN TIME: 12.8 SECONDS (ref 11.7–14.2)
RBC # BLD AUTO: 5.28 10*6/MM3 (ref 4.14–5.8)
SODIUM SERPL-SCNC: 139 MMOL/L (ref 136–145)
SP GR UR STRIP: 1.02 (ref 1–1.03)
UROBILINOGEN UR QL STRIP: NORMAL
WBC # BLD AUTO: 7.16 10*3/MM3 (ref 3.4–10.8)

## 2020-07-07 PROCEDURE — 71046 X-RAY EXAM CHEST 2 VIEWS: CPT

## 2020-07-07 PROCEDURE — 85025 COMPLETE CBC W/AUTO DIFF WBC: CPT

## 2020-07-07 PROCEDURE — 80053 COMPREHEN METABOLIC PANEL: CPT

## 2020-07-07 PROCEDURE — 81003 URINALYSIS AUTO W/O SCOPE: CPT

## 2020-07-07 PROCEDURE — 36415 COLL VENOUS BLD VENIPUNCTURE: CPT

## 2020-07-07 PROCEDURE — 85610 PROTHROMBIN TIME: CPT

## 2020-07-20 ENCOUNTER — TRANSCRIBE ORDERS (OUTPATIENT)
Dept: LAB | Facility: HOSPITAL | Age: 65
End: 2020-07-20

## 2020-07-20 ENCOUNTER — LAB (OUTPATIENT)
Dept: LAB | Facility: HOSPITAL | Age: 65
End: 2020-07-20

## 2020-07-20 DIAGNOSIS — Z01.818 PRE-OP EXAM: ICD-10-CM

## 2020-07-20 DIAGNOSIS — Z01.818 PRE-OP EXAM: Primary | ICD-10-CM

## 2020-07-20 PROCEDURE — C9803 HOPD COVID-19 SPEC COLLECT: HCPCS

## 2020-07-20 PROCEDURE — U0004 COV-19 TEST NON-CDC HGH THRU: HCPCS

## 2020-07-21 LAB
REF LAB TEST METHOD: NORMAL
SARS-COV-2 RNA RESP QL NAA+PROBE: NOT DETECTED

## 2020-08-31 ENCOUNTER — TELEPHONE (OUTPATIENT)
Dept: GASTROENTEROLOGY | Facility: CLINIC | Age: 65
End: 2020-08-31

## 2021-03-22 ENCOUNTER — BULK ORDERING (OUTPATIENT)
Dept: CASE MANAGEMENT | Facility: OTHER | Age: 66
End: 2021-03-22

## 2021-03-22 DIAGNOSIS — Z23 IMMUNIZATION DUE: ICD-10-CM

## 2021-05-11 RX ORDER — PANTOPRAZOLE SODIUM 40 MG/1
TABLET, DELAYED RELEASE ORAL
Qty: 90 TABLET | Refills: 3 | Status: SHIPPED | OUTPATIENT
Start: 2021-05-11

## 2023-04-28 ENCOUNTER — TRANSCRIBE ORDERS (OUTPATIENT)
Dept: ADMINISTRATIVE | Facility: HOSPITAL | Age: 68
End: 2023-04-28
Payer: COMMERCIAL

## 2023-04-28 ENCOUNTER — TRANSCRIBE ORDERS (OUTPATIENT)
Dept: ADMINISTRATIVE | Facility: HOSPITAL | Age: 68
End: 2023-04-28

## 2023-04-28 DIAGNOSIS — G89.28 PAIN, POSTOPERATIVE, CHRONIC: ICD-10-CM

## 2023-04-28 DIAGNOSIS — Z85.820 HISTORY OF MELANOMA: Primary | ICD-10-CM

## 2023-04-28 DIAGNOSIS — Z85.820 PERSONAL HISTORY OF MALIGNANT MELANOMA: Primary | ICD-10-CM

## 2023-04-28 DIAGNOSIS — G89.28 CHRONIC POST-OPERATIVE PAIN: ICD-10-CM

## 2023-04-28 DIAGNOSIS — G89.28 CHRONIC POSTOPERATIVE PAIN: ICD-10-CM

## 2023-09-12 ENCOUNTER — TRANSCRIBE ORDERS (OUTPATIENT)
Dept: SLEEP MEDICINE | Facility: HOSPITAL | Age: 68
End: 2023-09-12
Payer: COMMERCIAL

## 2023-09-12 DIAGNOSIS — G47.33 OBSTRUCTIVE SLEEP APNEA (ADULT) (PEDIATRIC): Primary | ICD-10-CM

## 2023-11-20 ENCOUNTER — HOSPITAL ENCOUNTER (OUTPATIENT)
Dept: SLEEP MEDICINE | Facility: HOSPITAL | Age: 68
Discharge: HOME | End: 2023-11-20
Admitting: INTERNAL MEDICINE
Payer: COMMERCIAL

## 2023-11-20 PROCEDURE — 95806 SLEEP STUDY UNATT&RESP EFFT: CPT

## 2024-01-12 ENCOUNTER — HOSPITAL ENCOUNTER (EMERGENCY)
Facility: HOSPITAL | Age: 69
Discharge: HOME | End: 2024-01-12
Attending: EMERGENCY MEDICINE
Payer: COMMERCIAL

## 2024-01-12 ENCOUNTER — APPOINTMENT (OUTPATIENT)
Dept: GENERAL RADIOLOGY | Facility: HOSPITAL | Age: 69
End: 2024-01-12
Payer: COMMERCIAL

## 2024-01-12 VITALS
TEMPERATURE: 98 F | RESPIRATION RATE: 18 BRPM | SYSTOLIC BLOOD PRESSURE: 150 MMHG | BODY MASS INDEX: 31.5 KG/M2 | OXYGEN SATURATION: 98 % | HEIGHT: 70 IN | DIASTOLIC BLOOD PRESSURE: 82 MMHG | WEIGHT: 220 LBS | HEART RATE: 91 BPM

## 2024-01-12 DIAGNOSIS — J06.9 VIRAL URI WITH COUGH: ICD-10-CM

## 2024-01-12 DIAGNOSIS — J40 BRONCHITIS: Primary | ICD-10-CM

## 2024-01-12 LAB
B PARAPERT DNA SPEC QL NAA+PROBE: NOT DETECTED
B PERT DNA SPEC QL NAA+PROBE: NOT DETECTED
C PNEUM DNA NPH QL NAA+NON-PROBE: NOT DETECTED
FLUAV SUBTYP SPEC NAA+PROBE: NOT DETECTED
FLUBV RNA ISLT QL NAA+PROBE: NOT DETECTED
HADV DNA SPEC NAA+PROBE: NOT DETECTED
HCOV 229E RNA SPEC QL NAA+PROBE: NOT DETECTED
HCOV HKU1 RNA SPEC QL NAA+PROBE: NOT DETECTED
HCOV NL63 RNA SPEC QL NAA+PROBE: NOT DETECTED
HCOV OC43 RNA SPEC QL NAA+PROBE: DETECTED
HMPV RNA NPH QL NAA+NON-PROBE: NOT DETECTED
HPIV1 RNA ISLT QL NAA+PROBE: NOT DETECTED
HPIV2 RNA SPEC QL NAA+PROBE: NOT DETECTED
HPIV3 RNA NPH QL NAA+PROBE: NOT DETECTED
HPIV4 P GENE NPH QL NAA+PROBE: NOT DETECTED
M PNEUMO IGG SER IA-ACNC: NOT DETECTED
RHINOVIRUS RNA SPEC NAA+PROBE: NOT DETECTED
RSV RNA NPH QL NAA+NON-PROBE: NOT DETECTED
SARS-COV-2 RNA NPH QL NAA+NON-PROBE: NOT DETECTED

## 2024-01-12 PROCEDURE — 99283 EMERGENCY DEPT VISIT LOW MDM: CPT

## 2024-01-12 PROCEDURE — 0202U NFCT DS 22 TRGT SARS-COV-2: CPT | Performed by: EMERGENCY MEDICINE

## 2024-01-12 PROCEDURE — 71045 X-RAY EXAM CHEST 1 VIEW: CPT

## 2024-01-12 RX ORDER — ALBUTEROL SULFATE 90 UG/1
2 AEROSOL, METERED RESPIRATORY (INHALATION) EVERY 4 HOURS PRN
Qty: 18 G | Refills: 0 | Status: SHIPPED | OUTPATIENT
Start: 2024-01-12

## 2024-01-12 RX ORDER — DEXTROMETHORPHAN HYDROBROMIDE AND PROMETHAZINE HYDROCHLORIDE 15; 6.25 MG/5ML; MG/5ML
5 SYRUP ORAL EVERY 6 HOURS PRN
Qty: 100 ML | Refills: 0 | Status: SHIPPED | OUTPATIENT
Start: 2024-01-12

## 2024-01-12 NOTE — ED TRIAGE NOTES
"Patient to er from home with c/o coughing and congestion for over 20 days. Patient stated he is on steroids and antibiotics and not getting better.\"   "

## 2024-01-12 NOTE — ED PROVIDER NOTES
EMERGENCY DEPARTMENT ENCOUNTER  Room Number:  T02/02  PCP: Silvia Mims APRN  Independent Historians: Patient      HPI:  Chief Complaint: had concerns including Cough and Nasal Congestion.     A complete HPI/ROS/PMH/PSH/SH/FH are unobtainable due to: None    Chronic or social conditions impacting patient care (Social Determinants of Health): None      Context: Dnagelo Song is a 68 y.o. male with a medical history of acid reflux and hypertension who presents to the ED c/o acute cough and congestion.  He states about 3 weeks ago the day after [place] he started developing upper respiratory symptoms including cough congestion and rhinorrhea.  He states his symptoms have persisted over the last several weeks.  He saw his ENT few days ago and was prescribed cefdinir and prednisone and has been taking that without any significant improvement.  He continues to have cough, occasionally productive.  He denies any shortness of breath.  No history of asthma or COPD, 10 years smoker that stopped in 1983.  Due to persistent symptoms despite treatment he presents for further evaluation.      Review of prior external notes (non-ED) -and- Review of prior external test results outside of this encounter:  Immunization record reviewed.  Patient given 3 doses of COVID-19 vaccination with the most recent being on 11/8/2021.    Prescription drug monitoring program review:     N/A    PAST MEDICAL HISTORY  Active Ambulatory Problems     Diagnosis Date Noted    No Active Ambulatory Problems     Resolved Ambulatory Problems     Diagnosis Date Noted    No Resolved Ambulatory Problems     Past Medical History:   Diagnosis Date    Acid reflux     Hypertension          PAST SURGICAL HISTORY  Past Surgical History:   Procedure Laterality Date    COLONOSCOPY  05/14/2012    ih,eh,sig tics,hp polyp    COLONOSCOPY  12/28/2017    NTEH, tics, torts, IH    ENDOSCOPY  05/14/2012    tort,hh,bilious gastric fluid,gastritis,zline irreg    KNEE  SURGERY      UPPER GASTROINTESTINAL ENDOSCOPY  2017    gastritis    WRIST SURGERY           FAMILY HISTORY  Family History   Problem Relation Age of Onset    Breast cancer Mother     Heart disease Father     Colon cancer Cousin     Cancer Cousin         prostate         SOCIAL HISTORY  Social History     Socioeconomic History    Marital status:    Tobacco Use    Smoking status: Former     Types: Cigarettes     Quit date:      Years since quittin.0    Smokeless tobacco: Never   Substance and Sexual Activity    Alcohol use: Yes     Comment: social    Drug use: No         ALLERGIES  Bee venom      REVIEW OF SYSTEMS  Review of Systems  Included in HPI  All systems reviewed and negative except for those discussed in HPI.      PHYSICAL EXAM    I have reviewed the triage vital signs and nursing notes.    ED Triage Vitals   Temp Heart Rate Resp BP SpO2   24 1115 24 1115 24 1115 24 1119 24 1115   98 °F (36.7 °C) 91 18 150/82 98 %      Temp src Heart Rate Source Patient Position BP Location FiO2 (%)   24 1115 -- 24 1119 -- --   Tympanic  Sitting         Physical Exam  GENERAL: alert, well-appearing, conversational, no acute distress  SKIN: Warm, dry  HENT: Normocephalic, atraumatic, posterior oropharynx is clear moist with no significant erythema or edema, no exudate  EYES: no scleral icterus  CV: regular rhythm, regular rate  RESPIRATORY: normal effort, lungs clear  ABDOMEN: soft, nontender, nondistended  MUSCULOSKELETAL: no deformity  NEURO: alert, moves all extremities, follows commands      NIH:                                                         PPE: The patient wore:  and I wore:  throughout the entire patient encounter.    LAB RESULTS  Recent Results (from the past 24 hour(s))   Respiratory Panel PCR w/COVID-19(SARS-CoV-2) ANIRUDH/MONICA/ESPERANZA/PAD/COR/JADE In-House, NP Swab in UTM/VTM, 2 HR TAT - Swab, Nasopharynx    Collection Time: 24 11:32 AM     Specimen: Nasopharynx; Swab   Result Value Ref Range    ADENOVIRUS, PCR Not Detected Not Detected    Coronavirus 229E Not Detected Not Detected    Coronavirus HKU1 Not Detected Not Detected    Coronavirus NL63 Not Detected Not Detected    Coronavirus OC43 Detected (A) Not Detected    COVID19 Not Detected Not Detected - Ref. Range    Human Metapneumovirus Not Detected Not Detected    Human Rhinovirus/Enterovirus Not Detected Not Detected    Influenza A PCR Not Detected Not Detected    Influenza B PCR Not Detected Not Detected    Parainfluenza Virus 1 Not Detected Not Detected    Parainfluenza Virus 2 Not Detected Not Detected    Parainfluenza Virus 3 Not Detected Not Detected    Parainfluenza Virus 4 Not Detected Not Detected    RSV, PCR Not Detected Not Detected    Bordetella pertussis pcr Not Detected Not Detected    Bordetella parapertussis PCR Not Detected Not Detected    Chlamydophila pneumoniae PCR Not Detected Not Detected    Mycoplasma pneumo by PCR Not Detected Not Detected         RADIOLOGY  XR Chest AP    Result Date: 1/12/2024  XR CHEST AP-  INDICATIONS: Cough and fever, COVID evaluation.  TECHNIQUE: FRONTAL VIEW OF THE CHEST  COMPARISON: 7/7/2020  FINDINGS:  The heart size is normal. Pulmonary vasculature is unremarkable. No focal pulmonary consolidation, pleural effusion, or thorax. Old granulomatous disease is apparent. No acute osseous process.       No acute pulmonary process identified. Follow-up as indications persist.    This report was finalized on 1/12/2024 12:30 PM by Dr. Juan Mitchell M.D on Workstation: [id number]         MEDICATIONS GIVEN IN ER  Medications - No data to display      ORDERS PLACED DURING THIS VISIT:  Orders Placed This Encounter   Procedures    Respiratory Panel PCR w/COVID-19(SARS-CoV-2) ANIRUDH/MONICA/ESPERANZA/PAD/COR/JADE In-House, NP Swab in UTM/VTM, 2 HR TAT - Swab, Nasopharynx    XR Chest AP         OUTPATIENT MEDICATION MANAGEMENT:  No current Epic-ordered facility-administered  medications on file.     Current Outpatient Medications Ordered in Epic   Medication Sig Dispense Refill    albuterol sulfate  (90 Base) MCG/ACT inhaler Inhale 2 puffs Every 4 (Four) Hours As Needed (cough). 18 g 0    ALPRAZolam (XANAX) 0.25 MG tablet TK 1 T PO TID PRA  0    doxycycline (VIBRAMYCIN) 100 MG capsule TK 1 C PO BID WC  0    EPIPEN 2-CONSUELO 0.3 MG/0.3ML solution auto-injector injection TAKE BY MOUTH AS DIRECTED  0    lisinopril-hydrochlorothiazide (PRINZIDE,ZESTORETIC) 10-12.5 MG per tablet Take 1 tablet by mouth Daily.      methylPREDNISolone (MEDROL, CONSUELO,) 4 MG tablet FPD  0    pantoprazole (PROTONIX) 40 MG EC tablet TAKE 1 TABLET BY MOUTH  DAILY 90 tablet 3    promethazine-dextromethorphan (PROMETHAZINE-DM) 6.25-15 MG/5ML syrup TK 5 TO 10 ML PO Q 6 H PRN  1    promethazine-dextromethorphan (PROMETHAZINE-DM) 6.25-15 MG/5ML syrup Take 5 mL by mouth Every 6 (Six) Hours As Needed for Cough. 100 mL 0    saccharomyces boulardii (FLORASTOR) 250 MG capsule Take 250 mg by mouth 2 (Two) Times a Day.      Testosterone Cypionate (DEPOTESTOTERONE CYPIONATE) 200 MG/ML injection INJECT 0.5ML ONCE WEEKLY  0         PROCEDURES  Procedures            PROGRESS, DATA ANALYSIS, CONSULTS, AND MEDICAL DECISION MAKING  All labs have been independently interpreted by me.  All radiology studies have been reviewed by me. All EKG's have been independently viewed and interpreted by me.  Discussion below represents my analysis of pertinent findings related to patient's condition, differential diagnosis, treatment plan and final disposition.    Differential diagnosis includes but is not limited to pneumonia, viral URI, bronchitis, bronchospasm, postinfectious cough, medication side effect.        ED Course as of 01/12/24 1315   Fri Jan 12, 2024   1212 My independent interpretation of the chest x-ray is no cardiomegaly or acute infiltrate. [KA]   1254 XR Chest AP  Per my independent interpretation of the chest x-ray, no evidence  of pneumothorax or obvious pneumonia [DC]   1306 I reassessed the patient, counseled him on his lab and imaging results.  He did test positive for coronavirus OC 43.  I do think this explains his persistent postinfectious cough.  He would like to continue his OTC Mucinex DM, have offered alternative medication for cough and he declined.  He would like to try an inhaler which I think is reasonable and have prescribed him albuterol.  He can follow-up with his PCP in 1 week as needed, return to ER for fevers and shortness of breath or any concerns.  I have encouraged him to continue the prednisone and cefdinir as prescribed by his ENT.  At this time I think it is unlikely that this is related to his lisinopril though this may need to be further evaluated if cough does not resolve. [KA]   1314 Patient changes mind and would like prescription antitussive, have prescribed Phenergan DM. [KA]      ED Course User Index  [DC] Remberto Frey MD  [KA] Emani Delgado PA-C           AS OF 13:15 EST VITALS:    BP - 150/82  HR - 91  TEMP - 98 °F (36.7 °C) (Tympanic)  O2 SATS - 98%    COMPLEXITY OF CARE  Admission was considered but after careful review of the patient's presentation, physical examination, diagnostic results, and response to treatment the patient may be safely discharged with outpatient follow-up.      DIAGNOSIS  Final diagnoses:   Bronchitis   Viral URI with cough         DISPOSITION  ED Disposition       ED Disposition   Discharge    Condition   Good    Comment   --                Please note that portions of this document were completed with a voice recognition program.    Note Disclaimer: At [facility], we believe that sharing information builds trust and better relationships. You are receiving this note because you recently visited [facility]. It is possible you will see health information before a provider has talked with you about it. This kind of information can be easy to misunderstand. To help  you fully understand what it means for your health, we urge you to discuss this note with your provider.         Emani Delgado PA-C  01/12/24 1303       Emani Delgado PA-C  01/12/24 [id number]

## 2024-01-12 NOTE — DISCHARGE INSTRUCTIONS
Do not take any other products that contain dextromethorphan while taking this cough syrup.  You will need to discontinue Mucinex DM.  You could take plain Mucinex.

## 2024-03-29 NOTE — SIGNIFICANT NOTE
Education provided the Patient on the following:    - Nothing to Eat or Drink after MN the night before the procedure  -You will need to have someone drive you home after your Surgery and remain with you for 24 hours after the Procedure  - The date of your procedure, your are welcome to have one visitor at bedside or remain within 10-15 minutes of [facility]  -Please wear warm socks when you arrive for your Surgery  -Remove all jewelry and leave any valuables before arriving on the date of your procedure (all will have to be removed before leaving preop)  -You will need to arrive at 0630 on 4-1-24 for your Surgery  -Feel free to contact us at: [phone] with any additional questions/concerns

## 2024-04-01 ENCOUNTER — ANESTHESIA (OUTPATIENT)
Dept: SURGERY | Facility: SURGERY CENTER | Age: 69
End: 2024-04-01
Payer: COMMERCIAL

## 2024-04-01 ENCOUNTER — HOSPITAL ENCOUNTER (OUTPATIENT)
Facility: SURGERY CENTER | Age: 69
Setting detail: HOSPITAL OUTPATIENT SURGERY
Discharge: HOME | End: 2024-04-01
Attending: OTOLARYNGOLOGY | Admitting: OTOLARYNGOLOGY
Payer: COMMERCIAL

## 2024-04-01 ENCOUNTER — ANESTHESIA EVENT (OUTPATIENT)
Dept: SURGERY | Facility: SURGERY CENTER | Age: 69
End: 2024-04-01
Payer: COMMERCIAL

## 2024-04-01 VITALS
TEMPERATURE: 98.2 F | HEART RATE: 71 BPM | RESPIRATION RATE: 16 BRPM | SYSTOLIC BLOOD PRESSURE: 134 MMHG | BODY MASS INDEX: 33.21 KG/M2 | OXYGEN SATURATION: 94 % | HEIGHT: 70 IN | DIASTOLIC BLOOD PRESSURE: 93 MMHG | WEIGHT: 232 LBS

## 2024-04-01 PROCEDURE — 25810000003 LACTATED RINGERS PER 1000 ML: Performed by: ANESTHESIOLOGY

## 2024-04-01 PROCEDURE — 25010000002 PROPOFOL 10 MG/ML EMULSION: Performed by: NURSE ANESTHETIST, CERTIFIED REGISTERED

## 2024-04-01 PROCEDURE — 25010000002 PROPOFOL 200 MG/20ML EMULSION: Performed by: NURSE ANESTHETIST, CERTIFIED REGISTERED

## 2024-04-01 PROCEDURE — 42975 DISE EVAL SLP DO BRTH FLX DX: CPT | Performed by: OTOLARYNGOLOGY

## 2024-04-01 RX ORDER — COLESEVELAM 180 1/1
625 TABLET ORAL 2 TIMES DAILY WITH MEALS
COMMUNITY

## 2024-04-01 RX ORDER — METOPROLOL SUCCINATE 25 MG/1
25 TABLET, EXTENDED RELEASE ORAL DAILY
COMMUNITY
Start: 2024-03-04 | End: 2025-03-04

## 2024-04-01 RX ORDER — FAMOTIDINE 10 MG/ML
20 INJECTION, SOLUTION INTRAVENOUS ONCE
Status: COMPLETED | OUTPATIENT
Start: 2024-04-01 | End: 2024-04-01

## 2024-04-01 RX ORDER — LIDOCAINE HYDROCHLORIDE 20 MG/ML
INJECTION, SOLUTION EPIDURAL; INFILTRATION; INTRACAUDAL; PERINEURAL AS NEEDED
Status: DISCONTINUED | OUTPATIENT
Start: 2024-04-01 | End: 2024-04-01 | Stop reason: SURG

## 2024-04-01 RX ORDER — PANTOPRAZOLE SODIUM 40 MG/1
40 TABLET, DELAYED RELEASE ORAL DAILY
COMMUNITY
Start: 2024-01-30 | End: 2025-01-29

## 2024-04-01 RX ORDER — SODIUM CHLORIDE 0.9 % (FLUSH) 0.9 %
3 SYRINGE (ML) INJECTION EVERY 12 HOURS SCHEDULED
Status: DISCONTINUED | OUTPATIENT
Start: 2024-04-01 | End: 2024-04-01 | Stop reason: HOSPADM

## 2024-04-01 RX ORDER — DROPERIDOL 2.5 MG/ML
0.62 INJECTION, SOLUTION INTRAMUSCULAR; INTRAVENOUS
Status: DISCONTINUED | OUTPATIENT
Start: 2024-04-01 | End: 2024-04-01 | Stop reason: HOSPADM

## 2024-04-01 RX ORDER — NALOXONE HCL 0.4 MG/ML
0.2 VIAL (ML) INJECTION AS NEEDED
Status: DISCONTINUED | OUTPATIENT
Start: 2024-04-01 | End: 2024-04-01 | Stop reason: HOSPADM

## 2024-04-01 RX ORDER — PROMETHAZINE HYDROCHLORIDE 12.5 MG/1
25 TABLET ORAL ONCE AS NEEDED
Status: DISCONTINUED | OUTPATIENT
Start: 2024-04-01 | End: 2024-04-01 | Stop reason: HOSPADM

## 2024-04-01 RX ORDER — SODIUM CHLORIDE, SODIUM LACTATE, POTASSIUM CHLORIDE, CALCIUM CHLORIDE 600; 310; 30; 20 MG/100ML; MG/100ML; MG/100ML; MG/100ML
9 INJECTION, SOLUTION INTRAVENOUS CONTINUOUS
Status: DISCONTINUED | OUTPATIENT
Start: 2024-04-01 | End: 2024-04-01 | Stop reason: HOSPADM

## 2024-04-01 RX ORDER — LATANOPROST 50 UG/ML
1 SOLUTION/ DROPS OPHTHALMIC DAILY
COMMUNITY
Start: 2023-11-16

## 2024-04-01 RX ORDER — PROPOFOL 10 MG/ML
INJECTION, EMULSION INTRAVENOUS AS NEEDED
Status: DISCONTINUED | OUTPATIENT
Start: 2024-04-01 | End: 2024-04-01 | Stop reason: SURG

## 2024-04-01 RX ORDER — FENTANYL CITRATE 50 UG/ML
25 INJECTION, SOLUTION INTRAMUSCULAR; INTRAVENOUS
Status: DISCONTINUED | OUTPATIENT
Start: 2024-04-01 | End: 2024-04-01 | Stop reason: HOSPADM

## 2024-04-01 RX ORDER — DIPHENHYDRAMINE HYDROCHLORIDE 50 MG/ML
12.5 INJECTION INTRAMUSCULAR; INTRAVENOUS
Status: DISCONTINUED | OUTPATIENT
Start: 2024-04-01 | End: 2024-04-01 | Stop reason: HOSPADM

## 2024-04-01 RX ORDER — PROMETHAZINE HYDROCHLORIDE 25 MG/1
25 SUPPOSITORY RECTAL ONCE AS NEEDED
Status: DISCONTINUED | OUTPATIENT
Start: 2024-04-01 | End: 2024-04-01 | Stop reason: HOSPADM

## 2024-04-01 RX ORDER — SODIUM CHLORIDE 0.9 % (FLUSH) 0.9 %
3-10 SYRINGE (ML) INJECTION AS NEEDED
Status: DISCONTINUED | OUTPATIENT
Start: 2024-04-01 | End: 2024-04-01 | Stop reason: HOSPADM

## 2024-04-01 RX ORDER — OXYMETAZOLINE HYDROCHLORIDE 0.05 G/100ML
2 SPRAY NASAL 2 TIMES DAILY
Status: DISCONTINUED | OUTPATIENT
Start: 2024-04-01 | End: 2024-04-01 | Stop reason: HOSPADM

## 2024-04-01 RX ORDER — NITROGLYCERIN 0.4 MG/1
0.4 TABLET SUBLINGUAL
COMMUNITY
Start: 2023-09-18

## 2024-04-01 RX ORDER — ATORVASTATIN CALCIUM 20 MG/1
20 TABLET, FILM COATED ORAL DAILY
COMMUNITY
Start: 2024-01-30

## 2024-04-01 RX ORDER — ONDANSETRON 2 MG/ML
4 INJECTION INTRAMUSCULAR; INTRAVENOUS ONCE AS NEEDED
Status: DISCONTINUED | OUTPATIENT
Start: 2024-04-01 | End: 2024-04-01 | Stop reason: HOSPADM

## 2024-04-01 RX ORDER — FLUMAZENIL 0.1 MG/ML
0.2 INJECTION INTRAVENOUS AS NEEDED
Status: DISCONTINUED | OUTPATIENT
Start: 2024-04-01 | End: 2024-04-01 | Stop reason: HOSPADM

## 2024-04-01 RX ORDER — CLOPIDOGREL BISULFATE 75 MG/1
75 TABLET ORAL DAILY
COMMUNITY
Start: 2023-04-28

## 2024-04-01 RX ADMIN — FAMOTIDINE 20 MG: 10 INJECTION, SOLUTION INTRAVENOUS at 07:22

## 2024-04-01 RX ADMIN — LIDOCAINE HYDROCHLORIDE 100 MG: 20 INJECTION, SOLUTION EPIDURAL; INFILTRATION; INTRACAUDAL; PERINEURAL at 08:12

## 2024-04-01 RX ADMIN — PROPOFOL 10 MG: 10 INJECTION, EMULSION INTRAVENOUS at 08:15

## 2024-04-01 RX ADMIN — NASAL DECONGESTANT 2 SPRAY: 0.05 SPRAY NASAL at 07:58

## 2024-04-01 RX ADMIN — SODIUM CHLORIDE, SODIUM LACTATE, POTASSIUM CHLORIDE, AND CALCIUM CHLORIDE 9 ML/HR: .6; .31; .03; .02 INJECTION, SOLUTION INTRAVENOUS at 07:21

## 2024-04-01 RX ADMIN — NASAL DECONGESTANT 2 SPRAY: 0.05 SPRAY NASAL at 07:21

## 2024-04-01 RX ADMIN — PROPOFOL 10 MG: 10 INJECTION, EMULSION INTRAVENOUS at 08:18

## 2024-04-01 RX ADMIN — PROPOFOL 100 MCG/KG/MIN: 10 INJECTION, EMULSION INTRAVENOUS at 08:12

## 2024-04-01 RX ADMIN — PROPOFOL 10 MG: 10 INJECTION, EMULSION INTRAVENOUS at 08:13

## 2024-04-01 NOTE — ANESTHESIA PREPROCEDURE EVALUATION
Anesthesia Evaluation     Patient summary reviewed   no history of anesthetic complications:   NPO Solid Status: > 8 hours  NPO Liquid Status: > 2 hours           Airway   Mallampati: II  TM distance: >3 FB  Neck ROM: full  No difficulty expected  Dental - normal exam     Pulmonary     breath sounds clear to auscultation  (+) a smoker Former,sleep apnea  (-) shortness of breath, recent URI  Cardiovascular   Exercise tolerance: good (4-7 METS)    PT is on anticoagulation therapy  Patient on routine beta blocker and Beta blocker given within 24 hours of surgery  Rhythm: regular  Rate: normal    (+) hypertension, valvular problems/murmurs MR, dysrhythmias PVC, CHF , hyperlipidemia  (-) past MI, angina    ROS comment: 7/2023 MPI - FINAL IMPRESSION:   1. Abnormal nuclear stress test.   2. Fixed inferior wall defect, cannot rule out previous inferior wall infarct.   3. No significant reversible ischemia.   4. Ejection fraction 43%.     Cath performed 9/2023    40% stenosis at most, no interventions indicated; continued medical mgmt    Neuro/Psych  (-) seizures, CVA  GI/Hepatic/Renal/Endo    (+) obesity, GERD  (-) no renal disease, diabetes    Musculoskeletal     (-) neck stiffness  Abdominal    Substance History      OB/GYN          Other                          Anesthesia Plan    ASA 3     MAC     (MAC anesthesia discussed with patient and/or patient representative. Risks (including but not limited to intra-op awareness), benefits, and alternatives were discussed. Understanding was voiced with an agreement to proceed with a MAC technique and General as a backup option. )    Anesthetic plan, risks, benefits, and alternatives have been provided, discussed and informed consent has been obtained with: patient.        CODE STATUS:

## 2024-04-01 NOTE — ANESTHESIA POSTPROCEDURE EVALUATION
"Patient: Dangelo Song    Procedure Summary       Date: 04/01/24 Room / Location: [facility] / [facility]    Anesthesia Start: 0810 Anesthesia Stop: 0828    Procedure: Evaluation of Sleep disordered breathing by examination of upper airway using an endoscope (Throat) Diagnosis:       Obstructive sleep apnea (adult) (pediatric)      (Obstructive sleep apnea (adult) (pediatric) [G47.33])    Surgeons: Cory Hidalgo MD Provider: Denis Bergman DO    Anesthesia Type: MAC ASA Status: 3            Anesthesia Type: MAC    Vitals  Vitals Value Taken Time   /93 04/01/24 0854   Temp 36.8 °C (98.2 °F) 04/01/24 0830   Pulse 72 04/01/24 0845   Resp 16 04/01/24 0844   SpO2 94 % 04/01/24 0844   Vitals shown include unfiled device data.        Post Anesthesia Care and Evaluation    Patient location during evaluation: bedside  Patient participation: complete - patient participated  Level of consciousness: awake and alert  Pain management: adequate    Airway patency: patent  Anesthetic complications: No anesthetic complications  PONV Status: controlled  Cardiovascular status: acceptable and hemodynamically stable  Respiratory status: acceptable, spontaneous ventilation and nonlabored ventilation  Hydration status: acceptable    Comments: /93   Pulse 71   Temp 36.8 °C (98.2 °F) (Infrared)   Resp 16   Ht 177.8 cm (70\")   Wt 105 kg (232 lb)   SpO2 95%   BMI 33.29 kg/m²       "

## 2024-04-01 NOTE — OP NOTE
Preop diagnosis: Obstructive sleep apnea, moderate, intolerant of positive pressure therapy     Postop diagnosis: Same     Seizure: Drug-induced sleep endoscopy     Surgeon: Rocky     Findings: No evidence of concentric or lateral wall collapse     Specimen: None     Blood loss: None     Locations: None     Description: Patient was placed supine on the operating table where slow drip anesthesia was administered per drug-induced sleep endoscopy protocol is developed by [employer].     As the patient developed a slight snore, we submitted the 0 degree flexible fiberoptic endoscope through the nasal passage.  The scope was placed high in the nasopharynx for inferiorly directed view of the vellum and pharynx.     We observe several cycles of breathing and light snoring without evidence of lateral wall collapse or concentric collapse.     Based on these observations, the procedure was terminated.  Video documentation had been obtained.     The patient was awakened  and returned to recovery.    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

## 2024-04-01 NOTE — H&P
Patient Care Team:  Silvia Mims APRN as PCP - General (Nurse Practitioner)    Chief complaint I cannot use my CPAP    Subjective     Patient is a 68 y.o. male presents with moderate obstructive sleep apnea. Onset of symptoms was gradual starting several years ago.  Symptoms are associated with frequent awakening, mask displacement, morning fog,.  Symptoms are aggravated by upper respiratory illness.   Symptoms improve with nothing moderate. Severity moderate context overall cardiorespiratory health quality not applicable    Review of Systems   Pertinent items are noted in HPI, all other systems reviewed and negative  Patient does report that he had been treated for an upper respiratory illness which began in December and has noted some nighttime congestion.  He denies cough and wheezing currently.    History  Past Medical History:   Diagnosis Date    Acid reflux     Hypertension     Sleep apnea      Past Surgical History:   Procedure Laterality Date    COLONOSCOPY  2012    ih,eh,sig tics,hp polyp    COLONOSCOPY  2017    NTEH, tics, torts, IH    ENDOSCOPY  2012    tort,hh,bilious gastric fluid,gastritis,zline irreg    KNEE SURGERY      UPPER GASTROINTESTINAL ENDOSCOPY  2017    gastritis    WRIST SURGERY       Family History   Problem Relation Age of Onset    Breast cancer Mother     Heart disease Father     Colon cancer Cousin     Cancer Cousin         prostate     Social History     Tobacco Use    Smoking status: Former     Current packs/day: 0.00     Types: Cigarettes     Quit date:      Years since quittin.2    Smokeless tobacco: Never   Substance Use Topics    Alcohol use: Yes     Comment: social    Drug use: No     E-cigarette/Vaping     E-cigarette/Vaping Substances     Medications Prior to Admission   Medication Sig Dispense Refill Last Dose    atorvastatin (LIPITOR) 20 MG tablet Take 1 tablet by mouth Daily.   Past Week    clopidogrel (PLAVIX) 75 MG tablet Take 1  tablet by mouth Daily.   3/30/2024 at 0900    colesevelam (WELCHOL) 625 MG tablet Take 1 tablet by mouth 2 (Two) Times a Day With Meals.   Past Week    latanoprost (XALATAN) 0.005 % ophthalmic solution Apply 1 drop to eye(s) as directed by provider Daily.   Past Week    lisinopril-hydrochlorothiazide (PRINZIDE,ZESTORETIC) 10-12.5 MG per tablet Take 1 tablet by mouth Daily.   Past Week    metoprolol succinate XL (TOPROL-XL) 25 MG 24 hr tablet Take 1 tablet by mouth Daily.   Past Week    pantoprazole (PROTONIX) 40 MG EC tablet Take 1 tablet by mouth Daily.   Past Week    Testosterone Cypionate (DEPOTESTOTERONE CYPIONATE) 200 MG/ML injection INJECT 0.5ML ONCE WEEKLY  0 Past Week    nitroglycerin (Nitrostat) 0.4 MG SL tablet Place 1 tablet under the tongue Every 5 (Five) Minutes As Needed.   Unknown     Allergies:  Bee venom    Objective     Vital Signs  Temp:  [97.5 °F (36.4 °C)] 97.5 °F (36.4 °C)  Heart Rate:  [80] 80  Resp:  [20] 20  BP: (123)/(94) 123/94    Physical Exam:    No exam performed today,    Results Review:    None    Assessment & Plan       * No active hospital problems. *      Impression: Moderate obstructive sleep apnea intolerant of CPAP    Plan: Drug-induced sleep endoscopy    I discussed the patients findings and my recommendations with patient.     Cory Hidalgo MD  04/01/24  08:06 EDT    Time: 10 minutes

## 2024-06-03 ENCOUNTER — OFFICE VISIT (OUTPATIENT)
Dept: INTERNAL MEDICINE | Facility: CLINIC | Age: 69
End: 2024-06-03
Payer: COMMERCIAL

## 2024-06-03 ENCOUNTER — LAB (OUTPATIENT)
Facility: HOSPITAL | Age: 69
End: 2024-06-03
Payer: COMMERCIAL

## 2024-06-03 VITALS
DIASTOLIC BLOOD PRESSURE: 80 MMHG | HEART RATE: 84 BPM | WEIGHT: 230.85 LBS | TEMPERATURE: 96.8 F | OXYGEN SATURATION: 96 % | HEIGHT: 70 IN | BODY MASS INDEX: 33.05 KG/M2 | SYSTOLIC BLOOD PRESSURE: 132 MMHG

## 2024-06-03 DIAGNOSIS — I42.0 NONISCHEMIC CONGESTIVE CARDIOMYOPATHY: ICD-10-CM

## 2024-06-03 DIAGNOSIS — G47.33 OBSTRUCTIVE SLEEP APNEA: ICD-10-CM

## 2024-06-03 DIAGNOSIS — Z11.59 ENCOUNTER FOR HEPATITIS C SCREENING TEST FOR LOW RISK PATIENT: ICD-10-CM

## 2024-06-03 DIAGNOSIS — I10 HYPERTENSION, UNSPECIFIED TYPE: Primary | ICD-10-CM

## 2024-06-03 DIAGNOSIS — E78.5 HYPERLIPIDEMIA, UNSPECIFIED HYPERLIPIDEMIA TYPE: ICD-10-CM

## 2024-06-03 DIAGNOSIS — I25.10 ATHEROSCLEROSIS OF CORONARY ARTERY OF NATIVE HEART, UNSPECIFIED VESSEL OR LESION TYPE, UNSPECIFIED WHETHER ANGINA PRESENT: ICD-10-CM

## 2024-06-03 PROBLEM — E29.1 MALE HYPOGONADISM: Status: ACTIVE | Noted: 2018-11-02

## 2024-06-03 LAB
ALBUMIN SERPL-MCNC: 4.3 G/DL (ref 3.5–5.2)
ALBUMIN/GLOB SERPL: 1.5 G/DL
ALP SERPL-CCNC: 63 U/L (ref 39–117)
ALT SERPL W P-5'-P-CCNC: 32 U/L (ref 1–41)
ANION GAP SERPL CALCULATED.3IONS-SCNC: 12.1 MMOL/L (ref 5–15)
AST SERPL-CCNC: 23 U/L (ref 1–40)
BILIRUB SERPL-MCNC: 0.7 MG/DL (ref 0–1.2)
BUN SERPL-MCNC: 14 MG/DL (ref 8–23)
BUN/CREAT SERPL: 12.8 (ref 7–25)
CALCIUM SPEC-SCNC: 9.5 MG/DL (ref 8.6–10.5)
CHLORIDE SERPL-SCNC: 104 MMOL/L (ref 98–107)
CO2 SERPL-SCNC: 23.9 MMOL/L (ref 22–29)
CREAT SERPL-MCNC: 1.09 MG/DL (ref 0.76–1.27)
EGFRCR SERPLBLD CKD-EPI 2021: 74.4 ML/MIN/1.73
GLOBULIN UR ELPH-MCNC: 2.9 GM/DL
GLUCOSE SERPL-MCNC: 92 MG/DL (ref 65–99)
HCV AB SER QL: NORMAL
POTASSIUM SERPL-SCNC: 4.4 MMOL/L (ref 3.5–5.2)
PROT SERPL-MCNC: 7.2 G/DL (ref 6–8.5)
SODIUM SERPL-SCNC: 140 MMOL/L (ref 136–145)

## 2024-06-03 PROCEDURE — 99204 OFFICE O/P NEW MOD 45 MIN: CPT | Performed by: STUDENT IN AN ORGANIZED HEALTH CARE EDUCATION/TRAINING PROGRAM

## 2024-06-03 PROCEDURE — 80053 COMPREHEN METABOLIC PANEL: CPT | Performed by: STUDENT IN AN ORGANIZED HEALTH CARE EDUCATION/TRAINING PROGRAM

## 2024-06-03 PROCEDURE — 86803 HEPATITIS C AB TEST: CPT | Performed by: STUDENT IN AN ORGANIZED HEALTH CARE EDUCATION/TRAINING PROGRAM

## 2024-06-03 PROCEDURE — 36415 COLL VENOUS BLD VENIPUNCTURE: CPT | Performed by: STUDENT IN AN ORGANIZED HEALTH CARE EDUCATION/TRAINING PROGRAM

## 2024-06-03 RX ORDER — LOSARTAN POTASSIUM AND HYDROCHLOROTHIAZIDE 12.5; 1 MG/1; MG/1
TABLET ORAL
COMMUNITY
Start: 2024-05-26

## 2024-06-03 NOTE — PROGRESS NOTES
New Patient Office Visit      Patient Name: Dangelo Song  : 1955   MRN: [id number]   Care Team: Patient Care Team:  Bradley Lind MD as PCP - General (Internal Medicine)    Chief Complaint:    Chief Complaint   Patient presents with    Establish Care    Hypertension       History of Present Illness: Dangelo Song is a 68 y.o. male who is here today to establish care as a new patient    He was previously following with an APRN at [facility] however is transferring care here    He has no specific acute complaints or concerns today besides chronic medical conditions.  He recently had drug-induced sleep endoscopy for obstructive sleep apnea as he was intolerant of CPAP.  He states that he was told he is eligible for inspire however has not started this as he was told to follow-up with his prior PCP.    [facility] in which she is on testosterone supplementation.  He states that he gets lab work and sees his urologist every 6 months.    He also has had numerous cardiovascular testing and imaging and follows with Dr. Farrell at [facility].  He has had heart catheterization that demonstrated CAD with 40% stenosis however has not had any stenting done.  There was some concern regarding thoracic aortic aneurysm however per chart review I do not see this, however he was noted to have an echo that demonstrated an EF of 43%.  He is currently on losartan/hctz as well as metoprolol and denies any symptoms of leg swelling, shortness of breath or chest pain.  He also is currently on dual antiplatelet therapy as well as atorvastatin      Subjective      Review of Systems:   Review of Systems   Constitutional:  Negative for fatigue, unexpected weight gain and unexpected weight loss.   Respiratory:  Negative for shortness of breath.    Cardiovascular:  Negative for chest pain, palpitations and leg swelling.   Gastrointestinal:  Positive for GERD. Negative for  abdominal pain, constipation and diarrhea.   Genitourinary:  Positive for erectile dysfunction. Negative for dysuria and frequency.   Musculoskeletal:  Positive for arthralgias.   Neurological:  Negative for syncope, weakness and light-headedness.   Psychiatric/Behavioral:  Positive for sleep disturbance.     - See HPI    Past Medical History:   Past Medical History:   Diagnosis Date    Acid reflux     Hypertension     Sleep apnea        Past Surgical History:   Past Surgical History:   Procedure Laterality Date    COLONOSCOPY  2012    ih,eh,sig tics,hp polyp    COLONOSCOPY  2017    NTEH, tics, torts, IH    ENDOSCOPY  2012    tort,hh,bilious gastric fluid,gastritis,zline irreg    KNEE SURGERY      SLEEP ENDOSCOPY N/A 2024    Procedure: Evaluation of Sleep disordered breathing by examination of upper airway using an endoscope;  Surgeon: Cory Hidalgo MD;  Location: [facility] MAIN OR;  Service: ENT;  Laterality: N/A;    UPPER GASTROINTESTINAL ENDOSCOPY  2017    gastritis    WRIST SURGERY         Family History:   Family History   Problem Relation Age of Onset    Breast cancer Mother     Heart disease Father     Colon cancer Cousin     Cancer Cousin         prostate       Social History:   Social History     Socioeconomic History    Marital status:    Tobacco Use    Smoking status: Former     Current packs/day: 0.00     Types: Cigarettes     Quit date:      Years since quittin.4     Passive exposure: Past    Smokeless tobacco: Never   Vaping Use    Vaping status: Never Used   Substance and Sexual Activity    Alcohol use: Yes     Comment: social    Drug use: No    Sexual activity: Defer       Tobacco History:   Social History     Tobacco Use   Smoking Status Former    Current packs/day: 0.00    Types: Cigarettes    Quit date:     Years since quittin.4    Passive exposure: Past   Smokeless Tobacco Never       Medications:     Current Outpatient Medications:      "atorvastatin (LIPITOR) 20 MG tablet, Take 1 tablet by mouth Daily., Disp: , Rfl:     clopidogrel (PLAVIX) 75 MG tablet, Take 1 tablet by mouth Daily., Disp: , Rfl:     colesevelam (WELCHOL) 625 MG tablet, Take 1 tablet by mouth 2 (Two) Times a Day With Meals., Disp: , Rfl:     latanoprost (XALATAN) 0.005 % ophthalmic solution, Apply 1 drop to eye(s) as directed by provider Daily., Disp: , Rfl:     losartan-hydrochlorothiazide (HYZAAR) 100-12.5 MG per tablet, , Disp: , Rfl:     metoprolol succinate XL (TOPROL-XL) 25 MG 24 hr tablet, Take 1 tablet by mouth Daily., Disp: , Rfl:     nitroglycerin (Nitrostat) 0.4 MG SL tablet, Place 1 tablet under the tongue Every 5 (Five) Minutes As Needed., Disp: , Rfl:     pantoprazole (PROTONIX) 40 MG EC tablet, Take 1 tablet by mouth Daily., Disp: , Rfl:     Testosterone Cypionate (DEPOTESTOTERONE CYPIONATE) 200 MG/ML injection, INJECT 0.5ML ONCE WEEKLY, Disp: , Rfl: 0    Allergies:   Allergies   Allergen Reactions    Bee Venom        Objective     Physical Exam:  Vital Signs:   Vitals:    06/03/24 1025   BP: 132/80   Pulse: 84   Temp: 96.8 °F (36 °C)   TempSrc: Temporal   SpO2: 96%   Weight: 105 kg (230 lb 13.6 oz)   Height: 177.8 cm (70\")     Body mass index is 33.12 kg/m².     BMI is >= 30 and <35. (Class 1 Obesity). The following options were offered after discussion;: exercise counseling/recommendations      Physical Exam  Vitals reviewed.   Constitutional:       General: He is not in acute distress.     Appearance: Normal appearance. He is obese. He is not toxic-appearing.   HENT:      Head: Normocephalic and atraumatic.   Eyes:      General: No scleral icterus.     Conjunctiva/sclera: Conjunctivae normal.   Cardiovascular:      Rate and Rhythm: Normal rate and regular rhythm.      Heart sounds: Normal heart sounds. No murmur heard.  Pulmonary:      Effort: Pulmonary effort is normal.      Breath sounds: Normal breath sounds. No wheezing or rales.   Skin:     General: Skin is " warm and dry.   Neurological:      Mental Status: He is alert and oriented to person, place, and time.      Motor: No weakness.      Gait: Gait normal.   Psychiatric:         Mood and Affect: Mood normal.         Behavior: Behavior normal.         Assessment / Plan      Assessment/Plan:   Problems Addressed This Visit  Diagnoses and all orders for this visit:    1. Hypertension, unspecified type (Primary)  -     Comprehensive Metabolic Panel    2. Hyperlipidemia, unspecified hyperlipidemia type    3. Atherosclerosis of coronary artery of native heart, unspecified vessel or lesion type, unspecified whether angina present    4. Nonischemic congestive cardiomyopathy    5. Obstructive sleep apnea  -     Ambulatory Referral to Sleep Medicine    6. Encounter for hepatitis C screening test for low risk patient  -     Hepatitis C Antibody      Reviewed previous cardiology imaging (echo, CTA), previous PCP, labs obtained 11/2023, c-scope.     Mr. Song is  very pleasant 68-year-old male who presents to clinic today to establish care for management of chronic medical conditions    His hypertension is relatively well-controlled, 132/80 in clinic today.  I would like for him to check it at least 1-2 times a week at home to get a better idea of average readings.  Given his known chronic systolic heart failure with reduced ejection fraction he likely could benefit from up titration of medications as well as consider SGLT2i or ARNi however he is asymptomatic and tolerating current regimen which includes Hyzaar and metoprolol.     Will refer him to sleep medicine regarding next steps in treating LALO, is interested in INSPIRE and per sleep study done in hospital he is eligible.    HLD is chronic, stable most recent lipid panel in 11/2023 at goal and he is tolerating DAPT and atorvastatin well.       RTC in 6mo for CPE, labs today.       Plan of care reviewed with patient at the conclusion of today's visit. Education was provided  regarding diagnosis and management.  Patient verbalizes understanding of and agreement with management plan.      Follow Up:   Return in about 6 months (around 12/3/2024) for Annual physical.          MD AKSH[facility]  [address] [place] [phone]  Fax [phone]

## 2024-07-22 ENCOUNTER — OFFICE VISIT (OUTPATIENT)
Dept: SLEEP MEDICINE | Facility: HOSPITAL | Age: 69
End: 2024-07-22
Payer: COMMERCIAL

## 2024-07-22 VITALS — HEART RATE: 62 BPM | WEIGHT: 231 LBS | OXYGEN SATURATION: 96 % | BODY MASS INDEX: 33.07 KG/M2 | HEIGHT: 70 IN

## 2024-07-22 DIAGNOSIS — G47.33 OBSTRUCTIVE SLEEP APNEA: Primary | ICD-10-CM

## 2024-07-22 PROCEDURE — G0463 HOSPITAL OUTPT CLINIC VISIT: HCPCS

## 2024-07-22 PROCEDURE — 99204 OFFICE O/P NEW MOD 45 MIN: CPT | Performed by: PSYCHIATRY & NEUROLOGY

## 2024-07-22 NOTE — PROGRESS NOTES
Reason for Consultation: Sleep apnea        Patient Care Team:  Bradley Lind MD as PCP - General (Internal Medicine)  Jake Heath MD as Consulting Physician (Urology)  Patti Farrell MD as Consulting Physician (Cardiology)  Delroy Sandoval MD, MPH as Consulting Physician (Sleep Medicine)      History of present illness:    Thank you for asking me to see your patient.  The patient is a 68 y.o. male is at the visit unaccompanied.  He tells me that he was diagnosed with sleep apnea some years ago and he says he was intolerant of CPAP therapy.  He said the experience of the in-lab study was dysphoric as was his attempt to use CPAP equipment.  He has recently been reevaluated and is scheduled for an inspire device.  He came to asked my opinion about that.  I pasted below his most recent home sleep apnea test which was completed in November of last year.  His apnea hypopnea index is indeed within range for an inspire device and he has failed CPAP therapy and his BMI is also within the range.    We did discuss the option of trying CPAP again at least briefly while he waits on his inspire device procedure.  He is agreeable to that.  I made the case that the CPAP therapy is more effective, if it is tolerated.  His habitual bedtime is midnight and he gets up at 7:30 AM and he works in sales of pool chemicals to retailers.  He says he feels tired in the morning.  He denies parasomnias.  He is a bit claustrophobic especially with the mask on.    [place]: 8    Data Reviewed: I reviewed his sleep study and sleep questionnaire and medical chart      PMH:  Past Medical History:   Diagnosis Date    Acid reflux     Hypertension     Sleep apnea           Allergies:  Bee venom     Medication Review:   Current Outpatient Medications on File Prior to Visit   Medication Sig Dispense Refill    atorvastatin (LIPITOR) 20 MG tablet Take 1 tablet by mouth Daily.      clopidogrel (PLAVIX) 75 MG tablet Take 1 tablet by  "mouth Daily.      colesevelam (WELCHOL) 625 MG tablet Take 1 tablet by mouth 2 (Two) Times a Day With Meals.      latanoprost (XALATAN) 0.005 % ophthalmic solution Apply 1 drop to eye(s) as directed by provider Daily.      losartan-hydrochlorothiazide (HYZAAR) 100-12.5 MG per tablet       metoprolol succinate XL (TOPROL-XL) 25 MG 24 hr tablet Take 1 tablet by mouth Daily.      nitroglycerin (Nitrostat) 0.4 MG SL tablet Place 1 tablet under the tongue Every 5 (Five) Minutes As Needed.      pantoprazole (PROTONIX) 40 MG EC tablet Take 1 tablet by mouth Daily.      Testosterone Cypionate (DEPOTESTOTERONE CYPIONATE) 200 MG/ML injection INJECT 0.5ML ONCE WEEKLY  0     No current facility-administered medications on file prior to visit.         Vital Signs:    Vitals:    07/22/24 0900   Pulse: 62   SpO2: 96%   Weight: 105 kg (231 lb)   Height: 177.8 cm (70\")        Body mass index is 33.15 kg/m².  Neck Circumference: 19 inches  .BMIFOLLOWUP         Physical Exam:    Constitutional:  Well developed 68 y.o. male that appears in no apparent distress.  Awake & oriented times 3.  Normal mood with normal recent and remote memory and normal judgement.  Eyes:  Conjunctivae normal.  Oropharynx: Moist mucous membranes without exudate and Mallampati 4  Neck: Trachea midline  Respiratory: Effort is not labored  Cardiovascular: Radial pulse regular  Musculoskeletal: Gait appears normal, no digital clubbing evident, no pre-tibial edema        Impression:   Encounter Diagnoses   Name Primary?    Obstructive sleep apnea Yes     Patient's BMI is Body mass index is 33.15 kg/m².    Will get the patient set up with the mask and have him come back in about a week and we will see if we can find a pressure and mask that he can tolerate.  I do want this to interfere with his scheduled inspire device because he has already previously failed CPAP therapy.    Plan:    I got him a mask fit today at the clinic he is going to bring his new Yashira " machine in and then go to try and set it up to see if he can tolerate it 1 last time before he gets his inspire device implanted.    The patient should practice good sleep hygiene measures.      Weight loss might be beneficial in this patient who has a Body mass index is 33.15 kg/m².      Pathophysiology of LALO described to the patient.  Cardiovascular complications of untreated LALO also reviewed.      The patient was cautioned about the dangers of drowsy driving.    Terry Sandoval MD  Sleep Medicine  07/22/24  10:14 EDT

## 2024-08-13 ENCOUNTER — TELEPHONE (OUTPATIENT)
Dept: SLEEP MEDICINE | Facility: HOSPITAL | Age: 69
End: 2024-08-13
Payer: COMMERCIAL

## 2024-08-13 ENCOUNTER — OFFICE VISIT (OUTPATIENT)
Dept: SLEEP MEDICINE | Facility: HOSPITAL | Age: 69
End: 2024-08-13
Payer: COMMERCIAL

## 2024-08-13 VITALS — WEIGHT: 227 LBS | HEART RATE: 43 BPM | BODY MASS INDEX: 32.5 KG/M2 | OXYGEN SATURATION: 97 % | HEIGHT: 70 IN

## 2024-08-13 DIAGNOSIS — G47.33 OBSTRUCTIVE SLEEP APNEA: Primary | ICD-10-CM

## 2024-08-13 DIAGNOSIS — E66.09 CLASS 1 OBESITY DUE TO EXCESS CALORIES WITHOUT SERIOUS COMORBIDITY WITH BODY MASS INDEX (BMI) OF 32.0 TO 32.9 IN ADULT: ICD-10-CM

## 2024-08-13 DIAGNOSIS — I25.10 ATHEROSCLEROSIS OF CORONARY ARTERY OF NATIVE HEART, UNSPECIFIED VESSEL OR LESION TYPE, UNSPECIFIED WHETHER ANGINA PRESENT: ICD-10-CM

## 2024-08-13 PROBLEM — E66.811 CLASS 1 OBESITY DUE TO EXCESS CALORIES WITHOUT SERIOUS COMORBIDITY WITH BODY MASS INDEX (BMI) OF 32.0 TO 32.9 IN ADULT: Status: ACTIVE | Noted: 2024-08-13

## 2024-08-13 PROCEDURE — G0463 HOSPITAL OUTPT CLINIC VISIT: HCPCS

## 2024-08-13 PROCEDURE — 99214 OFFICE O/P EST MOD 30 MIN: CPT | Performed by: PSYCHIATRY & NEUROLOGY

## 2024-08-13 RX ORDER — CLOPIDOGREL BISULFATE 75 MG/1
75 TABLET ORAL DAILY
Qty: 30 TABLET | OUTPATIENT
Start: 2024-08-13

## 2024-08-13 NOTE — TELEPHONE ENCOUNTER
Lvm with results and faxed order to [facility] for set up. Pt will need to schedule a f/u for compliance.

## 2024-08-13 NOTE — TELEPHONE ENCOUNTER
Caller: Dangelo Song    Relationship: Self    Best call back number:     [phone] (Mobile)       Requested Prescriptions:   Requested Prescriptions     Pending Prescriptions Disp Refills    clopidogrel (PLAVIX) 75 MG tablet 30 tablet      Sig: Take 1 tablet by mouth Daily.        [facility] [address] [phone] [facility] [phone]      Last office visit with prescribing clinician: 6/3/2024   Last telemedicine visit with prescribing clinician: Visit date not found   Next office visit with prescribing clinician: 12/2/2024     Additional details provided by patient: HAS AT LEAST 3 DAYS     Does the patient have less than a 3 day supply:  [] Yes  [x] No    Would you like a call back once the refill request has been completed: [] Yes [x] No    If the office needs to give you a call back, can they leave a voicemail: [] Yes [x] No    Samantha Escobar Rep   08/13/24 09:54 EDT

## 2024-08-13 NOTE — PROGRESS NOTES
"Follow Up Sleep Disorders Center Note     Chief Complaint: Sleep apnea   Primary Care Physician: Bradley Lind MD    Interval History:   The patient is a 68 y.o. male  who who just got a replacement Yashira machine came in today so we get set up with auto titrating pressure spanning 5 to 15 cm water pressure.  He is interested in the inspire device but I thought he should give a trial of the a properly set CPAP machine so that he can determine if he still wants the inspire and so that he will qualify if he does not tolerate the CPAP.        Review of Systems:    A complete review of systems was done and all were negative with the exception of none    Social History:    Social History     Socioeconomic History    Marital status:    Tobacco Use    Smoking status: Former     Current packs/day: 0.00     Types: Cigarettes     Quit date:      Years since quittin.6     Passive exposure: Past    Smokeless tobacco: Never   Vaping Use    Vaping status: Never Used   Substance and Sexual Activity    Alcohol use: Yes     Comment: social    Drug use: No    Sexual activity: Defer       Allergies:  Bee venom     Medication Review:  Reviewed.      Vital Signs:    Vitals:    24 1131   Pulse: (!) 43   SpO2: 97%   Weight: 103 kg (227 lb)   Height: 177.8 cm (70\")     Body mass index is 32.57 kg/m².  .BMIFOLLOWUP    Physical Exam:    Constitutional:  Well developed 68 y.o. male that appears in no apparent distress.  Awake & oriented times 3.  Normal mood with normal recent and remote memory and normal judgement.  Eyes:  Conjunctivae normal.      Self-administered [place] Sleepiness Scale test results: 6  0-5 Lower normal daytime sleepiness  6-10 Higher normal daytime sleepiness  11-12 Mild, 13-15 Moderate, & 16-24 Severe excessive daytime sleepiness         Impression:     Encounter Diagnoses   Name Primary?    Obstructive sleep apnea Yes    Atherosclerosis of coronary artery of native heart, unspecified vessel or " lesion type, unspecified whether angina present     Class 1 obesity due to excess calories without serious comorbidity with body mass index (BMI) of 32.0 to 32.9 in adult          Plan:  He is going to try his new CPAP machine set with auto titrating pressure spanning 5 to 15 cm of water pressure for a month then I will see him back for compliance download.  He already has an appointment to see an ENT doctor about the inspire device.  I answered all of the patient's questions.  The patient will call the Sleep Disorder Center for any problems and will follow up 4 weeks.      Terry Sandoval MD  Sleep Medicine  08/13/24  13:17 EDT

## 2024-08-13 NOTE — TELEPHONE ENCOUNTER
Managed and refilled by his cardiologist , who has refilled these along with statin past two refills

## 2024-09-23 ENCOUNTER — OFFICE VISIT (OUTPATIENT)
Dept: SLEEP MEDICINE | Facility: HOSPITAL | Age: 69
End: 2024-09-23
Payer: COMMERCIAL

## 2024-09-23 VITALS — WEIGHT: 229 LBS | HEIGHT: 70 IN | BODY MASS INDEX: 32.78 KG/M2 | OXYGEN SATURATION: 95 % | HEART RATE: 72 BPM

## 2024-09-23 DIAGNOSIS — G47.33 OBSTRUCTIVE SLEEP APNEA: Primary | ICD-10-CM

## 2024-09-23 DIAGNOSIS — E66.09 CLASS 1 OBESITY DUE TO EXCESS CALORIES WITHOUT SERIOUS COMORBIDITY WITH BODY MASS INDEX (BMI) OF 32.0 TO 32.9 IN ADULT: ICD-10-CM

## 2024-09-23 DIAGNOSIS — I25.10 ATHEROSCLEROSIS OF CORONARY ARTERY OF NATIVE HEART, UNSPECIFIED VESSEL OR LESION TYPE, UNSPECIFIED WHETHER ANGINA PRESENT: ICD-10-CM

## 2024-09-23 DIAGNOSIS — I10 BENIGN HYPERTENSION: ICD-10-CM

## 2024-09-23 DIAGNOSIS — I42.0 NONISCHEMIC CONGESTIVE CARDIOMYOPATHY: ICD-10-CM

## 2024-09-23 PROCEDURE — G0463 HOSPITAL OUTPT CLINIC VISIT: HCPCS

## 2024-09-23 PROCEDURE — 99214 OFFICE O/P EST MOD 30 MIN: CPT | Performed by: PSYCHIATRY & NEUROLOGY

## 2024-09-25 ENCOUNTER — TELEPHONE (OUTPATIENT)
Dept: SLEEP MEDICINE | Facility: HOSPITAL | Age: 69
End: 2024-09-25
Payer: COMMERCIAL

## 2024-10-07 ENCOUNTER — PRE-ADMISSION TESTING (OUTPATIENT)
Dept: PREADMISSION TESTING | Facility: HOSPITAL | Age: 69
End: 2024-10-07
Payer: COMMERCIAL

## 2024-10-07 VITALS
WEIGHT: 232.2 LBS | BODY MASS INDEX: 33.24 KG/M2 | OXYGEN SATURATION: 97 % | HEART RATE: 68 BPM | RESPIRATION RATE: 16 BRPM | SYSTOLIC BLOOD PRESSURE: 118 MMHG | HEIGHT: 70 IN | DIASTOLIC BLOOD PRESSURE: 72 MMHG

## 2024-10-07 LAB
ANION GAP SERPL CALCULATED.3IONS-SCNC: 10.7 MMOL/L (ref 5–15)
BUN SERPL-MCNC: 13 MG/DL (ref 8–23)
BUN/CREAT SERPL: 11.8 (ref 7–25)
CALCIUM SPEC-SCNC: 9.2 MG/DL (ref 8.6–10.5)
CHLORIDE SERPL-SCNC: 104 MMOL/L (ref 98–107)
CO2 SERPL-SCNC: 26.3 MMOL/L (ref 22–29)
CREAT SERPL-MCNC: 1.1 MG/DL (ref 0.76–1.27)
DEPRECATED RDW RBC AUTO: 41.8 FL (ref 37–54)
EGFRCR SERPLBLD CKD-EPI 2021: 73.6 ML/MIN/1.73
ERYTHROCYTE [DISTWIDTH] IN BLOOD BY AUTOMATED COUNT: 13 % (ref 12.3–15.4)
GLUCOSE SERPL-MCNC: 110 MG/DL (ref 65–99)
HCT VFR BLD AUTO: 45.1 % (ref 37.5–51)
HGB BLD-MCNC: 15.2 G/DL (ref 13–17.7)
MCH RBC QN AUTO: 30.2 PG (ref 26.6–33)
MCHC RBC AUTO-ENTMCNC: 33.7 G/DL (ref 31.5–35.7)
MCV RBC AUTO: 89.5 FL (ref 79–97)
PLATELET # BLD AUTO: 165 10*3/MM3 (ref 140–450)
PMV BLD AUTO: 10 FL (ref 6–12)
POTASSIUM SERPL-SCNC: 3.7 MMOL/L (ref 3.5–5.2)
RBC # BLD AUTO: 5.04 10*6/MM3 (ref 4.14–5.8)
SODIUM SERPL-SCNC: 141 MMOL/L (ref 136–145)
WBC NRBC COR # BLD AUTO: 6.45 10*3/MM3 (ref 3.4–10.8)

## 2024-10-07 PROCEDURE — 36415 COLL VENOUS BLD VENIPUNCTURE: CPT

## 2024-10-07 PROCEDURE — 93005 ELECTROCARDIOGRAM TRACING: CPT

## 2024-10-07 PROCEDURE — 80048 BASIC METABOLIC PNL TOTAL CA: CPT | Performed by: OTOLARYNGOLOGY

## 2024-10-07 PROCEDURE — 85027 COMPLETE CBC AUTOMATED: CPT | Performed by: OTOLARYNGOLOGY

## 2024-10-07 PROCEDURE — 93010 ELECTROCARDIOGRAM REPORT: CPT | Performed by: INTERNAL MEDICINE

## 2024-10-07 NOTE — DISCHARGE INSTRUCTIONS
PRE-ADMISSION TESTING INSTRUCTIONS FOR ADULTS    Take these medications the morning of surgery with a small sip of water:   atorvastatin, metoprolol, pantoprazole      Do not take any insulin or diabetes medications the morning of surgery.      No aspirin, advil, aleve, ibuprofen, naproxen, diet pills, decongestants, or herbal/vitamins for a week prior to surgery.       Tylenol/Acetaminophen is okay to take if needed.    General Instructions:    DO NOT EAT SOLID FOOD AFTER MIDNIGHT THE NIGHT BEFORE SURGERY. No gum, mints, or hard candy after midnight the night before surgery.  You may drink clear liquids the day of surgery up until 2 hours before your arrival time.  Clear liquids are liquids you can see through. Nothing RED in color.    Plain water    Sports drinks      Gelatin (Jell-O)  Fruit juices without pulp such as white grape juice and apple juice  Popsicles that contain no fruit or yogurt  Tea or coffee (no cream or milk added)    It is beneficial for you to have a clear drink that contains carbohydrates 2 hours before your arrival time.  We suggest a 20 ounce bottle of Gatorade or Powerade for non-diabetic patients or a 20 ounce bottle of Gatorade Zero or Powerade Zero for diabetic patients.     Patients who avoid smoking, chewing tobacco and alcohol for 4 weeks prior to surgery have a reduced risk of post-operative complications.  If at all possible, quit smoking as many days before surgery as you can.    Do not smoke, use chewing tobacco or drink alcohol the day of surgery    Bring your C-PAP/ BI-PAP machine if you use one.  Wear clean comfortable clothes.  Do not wear contact lenses, lotion, deodorant, or make-up.  Bring a case for your glasses if applicable. You may brush your teeth the morning of surgery.  You may wear dentures/partials, do not put adhesive/glue on them.  Leave all other jewelry and valuables at home.      Preventing a Surgical Site Infection:    Shower the night before and on the  morning of surgery using the chlorhexidine soap you were given.  Use a clean washcloth with the soap.  Place clean sheets on your bed after showering the night before surgery. Do not use the CHG soap on your hair, face, or private areas. Wash your body gently for five (5) minutes. Do not scrub your skin.  Dry with a clean towel and dress in clean clothing.  Do not shave the surgical area for 10 days-2 weeks prior to surgery  because the razor can irritate skin and make it easier to develop an infection.  Make sure you, your family, and all healthcare providers clean their hands with soap and water or an alcohol based hand  before caring for you or your wound.      Day of surgery:    Your surgeon’s office will advise you of your arrival time for the day of surgery.    Upon arrival, a Pre-op nurse and Anesthesia provider will review your health history, obtain vital signs, and answer questions you may have. The anesthesia provider will also discuss the type of anesthesia that will be needed for your procedure, which may include general anesthesia. The only belongings needed at this time will be your home medications and if applicable your C-PAP/BI-PAP machine.  If you are staying overnight your family can leave the rest of your belongings in the car and bring them to your room later.  A Pre-op nurse will start an IV and you may receive medication in preparation for surgery, including something to help you relax.  Your family will be able to see you in the Pre-op area.  While you are in surgery your family should notify the waiting room  if they leave the waiting room area and provide a contact phone number.    IF you have any questions, you can call the Pre-Admission Department at [phone] or your surgeon's office.  Notify your surgeon if  you become sick, have a fever, productive cough, or cannot be here the day of surgery    Please be aware that surgery does come with discomfort.  We want  to make every effort to control your discomfort so please discuss any uncontrolled symptoms with your nurse.   Your doctor will most likely have prescribed pain medications.      If you are going home after surgery, you will receive individualized written care instructions before being discharged.  A responsible adult (over the age of 18) must drive you to and from the hospital on the day of your surgery and stay with you for 24 hours after anesthesia.    If you are staying overnight following surgery, you will be transported to your hospital room following the recovery period.  [facility] has all private rooms.    You may receive a survey regarding the care you received. Your feedback is very important and will be used to collect the necessary data to help us to continue to provide excellent care.     Deductibles and co-payments are collected on the day of service. Please be prepared to pay the required co-pay, deductible or deposit on the day of service as defined by your plan.        Inspire Nerve Stimulator Implantation, Care After  This sheet gives you information about how to care for yourself after your procedure. Your health care provider may also give you more specific instructions. If you have problems or questions, contact your health care provider.  What can I expect after the procedure?  After your procedure, it is common to have soreness or pain in the incision area.  Follow these instructions at home:  Medicines  Take over-the-counter and prescription medicines only as told by your health care provider.  If you were prescribed an antibiotic medicine, take it as told by your health care provider. Do not stop using the antibiotic even if you start to feel better.  Incision care           Follow instructions from your health care provider about how to take care of your incision. Make sure you:  Wash your hands with soap and water before and after you change your bandage (dressing). If soap  and water are not available, use hand .  Change your dressing as told by your health care provider.  Leave stitches (sutures), skin glue, or adhesive strips in place. These skin closures may need to stay in place for 2 weeks or longer. If adhesive strip edges start to loosen and curl up, you may trim the loose edges. Do not remove adhesive strips completely unless your health care provider tells you to do that.  Check your incision area every day for signs of infection. Check for:  Redness, swelling, or more pain.  Fluid or blood.  Warmth.  Pus or a bad smell.  Activity  Return to your normal activities as told by your health care provider. Ask your health care provider what activities are safe for you.  Follow instructions from your health care provider about any activity restrictions. You may need to avoid:  Bending, twisting, or stretching.  Having sex.  Activities that require arm/shoulder motion for 2 weeks.  Strenuous activity (4 weeks)  ex: weight-lifting, golfing, running, jogging, skiing, ect.   Do not lift anything that is heavier than 10 lb (4.5 kg), or the limit that you are told, until your health care provider says that it is safe.  Keep arm in sling for 24 hours  Perform neck rolls 3x/day:10 clockwise, 10 counterclockwise.  Bathing  Do not take baths, swim, or use a hot tub until your health care provider approves. Ask your health care provider if you may take showers. You may only be allowed to take sponge baths.  Keep the dressing dry until your health care provider says it can be removed.  Using the Inspire nerve stimulator  You will be given a remote control device. This device will allow you to turn your Inspire nerve stimulator on and off. Follow instructions from your health care provider about how to use this device.  Tell all of your health care providers that you have this device. Remind them that you have the device before they do any tests or procedures.  General instructions  Do  not drive for 24 hours, you were given anesthesia during your procedure.  Do not drive or use heavy machinery while taking prescription pain medicine.  Follow your health care providers instructions on shaving facial hair.  Women should wear a comfortable brassier for 4 weeks.  Keep all follow-up visits as told by your health care provider. This is important. Your health care provider may need to adjust the stimulator over several visits until it works well for you.  Stimulator tuning visit @ 4 weeks, Fine tune sleep study @ 12 weeks  Contact a health care provider if:  Your device stops working.  The device is not helping your symptoms.  You have a fever or chills.  You have pus or a bad smell coming from your incision.  You have redness, swelling, or more pain around your incision.  You have fluid or blood coming from your incision.  Your incision feels warm to the touch.  Summary  After your procedure, it is common to have soreness or pain in the incision area.  Follow instructions from your health care provider about how to take care of your incision. Also, follow instructions about any activity restrictions. You may need to avoid activities that involve a lot of bending, twisting, or bouncing.  Tell all of your health care providers that you have this device. Remind them that you have the device before they do any tests or procedures.  Contact your health care provider if your device stops working, or if it is not helping to treat your symptoms.  Contact your health care provider if you have a fever, or if there is redness, warmth, swelling, pain, pus, or a bad smell in or around the incision.  This information is not intended to replace advice given to you by your health care provider. Make sure you discuss any questions you have with your health care provider.  Document Revised: 02/03/2020 Document Reviewed: 02/03/2020 Document adapted for Inspire Stimulator by [employer]AR[employer] 4/2021  Elsevier Patient Education © 2021  [employer]

## 2024-10-07 NOTE — PAT
Pt here for PAT visit.  Pre-op tests completed, chg soap given, and instructions reviewed.  Instructed clears until 2 hrs prior to arrival time, voiced understanding. Dr Hidalgo's office to advise patient on stopping plavix for procedure.

## 2024-10-08 LAB
QT INTERVAL: 425 MS
QTC INTERVAL: 469 MS

## 2024-10-14 ENCOUNTER — ANESTHESIA EVENT (OUTPATIENT)
Dept: PERIOP | Facility: HOSPITAL | Age: 69
End: 2024-10-14
Payer: COMMERCIAL

## 2024-10-16 ENCOUNTER — ANESTHESIA (OUTPATIENT)
Dept: PERIOP | Facility: HOSPITAL | Age: 69
End: 2024-10-16
Payer: COMMERCIAL

## 2024-10-16 ENCOUNTER — HOSPITAL ENCOUNTER (OUTPATIENT)
Facility: HOSPITAL | Age: 69
Setting detail: HOSPITAL OUTPATIENT SURGERY
Discharge: HOME | End: 2024-10-16
Attending: OTOLARYNGOLOGY | Admitting: OTOLARYNGOLOGY
Payer: COMMERCIAL

## 2024-10-16 VITALS
HEART RATE: 88 BPM | BODY MASS INDEX: 32.83 KG/M2 | TEMPERATURE: 98.3 F | RESPIRATION RATE: 14 BRPM | DIASTOLIC BLOOD PRESSURE: 90 MMHG | OXYGEN SATURATION: 97 % | SYSTOLIC BLOOD PRESSURE: 174 MMHG | WEIGHT: 228.8 LBS

## 2024-10-16 DIAGNOSIS — G47.33 OBSTRUCTIVE SLEEP APNEA: Primary | ICD-10-CM

## 2024-10-16 PROCEDURE — 25010000002 FENTANYL CITRATE (PF) 50 MCG/ML SOLUTION: Performed by: NURSE ANESTHETIST, CERTIFIED REGISTERED

## 2024-10-16 PROCEDURE — 25010000002 PROPOFOL 200 MG/20ML EMULSION: Performed by: NURSE ANESTHETIST, CERTIFIED REGISTERED

## 2024-10-16 PROCEDURE — 25010000002 LIDOCAINE 2% SOLUTION: Performed by: NURSE ANESTHETIST, CERTIFIED REGISTERED

## 2024-10-16 PROCEDURE — 25010000002 SUCCINYLCHOLINE PER 20 MG: Performed by: NURSE ANESTHETIST, CERTIFIED REGISTERED

## 2024-10-16 PROCEDURE — C1787 PATIENT PROGR, NEUROSTIM: HCPCS | Performed by: OTOLARYNGOLOGY

## 2024-10-16 PROCEDURE — 25810000003 LACTATED RINGERS PER 1000 ML: Performed by: NURSE ANESTHETIST, CERTIFIED REGISTERED

## 2024-10-16 PROCEDURE — 25010000002 PHENYLEPHRINE 10 MG/ML SOLUTION: Performed by: NURSE ANESTHETIST, CERTIFIED REGISTERED

## 2024-10-16 PROCEDURE — 25010000002 HYDROMORPHONE 1 MG/ML SOLUTION: Performed by: NURSE ANESTHETIST, CERTIFIED REGISTERED

## 2024-10-16 PROCEDURE — 25810000003 SODIUM CHLORIDE 0.9 % SOLUTION 250 ML FLEX CONT: Performed by: NURSE ANESTHETIST, CERTIFIED REGISTERED

## 2024-10-16 PROCEDURE — 25010000002 LIDOCAINE 1% - EPINEPHRINE 1:100000 1 %-1:100000 SOLUTION: Performed by: OTOLARYNGOLOGY

## 2024-10-16 PROCEDURE — C1778 LEAD, NEUROSTIMULATOR: HCPCS | Performed by: OTOLARYNGOLOGY

## 2024-10-16 PROCEDURE — 25010000002 PHENYLEPHRINE 10 MG/ML SOLUTION 5 ML VIAL: Performed by: NURSE ANESTHETIST, CERTIFIED REGISTERED

## 2024-10-16 PROCEDURE — 25010000002 ONDANSETRON PER 1 MG: Performed by: NURSE ANESTHETIST, CERTIFIED REGISTERED

## 2024-10-16 PROCEDURE — 25010000002 CEFAZOLIN PER 500 MG

## 2024-10-16 PROCEDURE — C1767 GENERATOR, NEURO NON-RECHARG: HCPCS | Performed by: OTOLARYNGOLOGY

## 2024-10-16 PROCEDURE — 25010000002 DEXAMETHASONE PER 1 MG: Performed by: NURSE ANESTHETIST, CERTIFIED REGISTERED

## 2024-10-16 DEVICE — THE INSPIRE® STIMULATION LEAD (MODEL 4063) IS DESIGNED TO DELIVER STIMULATION TO THE HYPOGLOSSAL NERVE FOR THE TREATMENT OF OBSTRUCTIVE SLEEP APNEA. THE LEAD FEATURES A FLEXIBLE, SELF-SIZING CUFF. ELECTRODES IN THE INNER SURFACE OF THE CUFF DELIVER STIMULATION TO THE NERVE. THE LEAD INCORPORATES A STANDARD CONNECTOR FOR COUPLING TO THE INSPIRE IMPLANTABLE PULSE GENERATOR (IPG).
Type: IMPLANTABLE DEVICE | Site: NECK | Status: FUNCTIONAL
Brand: INSPIRE

## 2024-10-16 DEVICE — GEN IPG INSPIRE4 RESP/SENSR/LD NONRECHG: Type: IMPLANTABLE DEVICE | Site: CHEST | Status: FUNCTIONAL

## 2024-10-16 DEVICE — THE INSPIRE® RESPIRATORY SENSING LEAD (MODEL 4340) IS DESIGNED TO DETECT RESPIRATORY EFFORT. THE LEAD FEATURES A PRESSURE SENSITIVE MEMBRANE THAT CONVERTS THE MECHANICAL ENERGY OF RESPIRATION INTO AN ELECTRICAL SIGNAL. THE LEAD INCORPORATES A STANDARD CONNECTOR FOR COUPLING TO THE INSPIRE IMPLANTABLE PULSE GENERATOR (IPG) FOR TREATMENT OF OBSTRUCTIVE SLEEP APNEA.
Type: IMPLANTABLE DEVICE | Site: CHEST | Status: FUNCTIONAL
Brand: INSPIRE

## 2024-10-16 RX ORDER — SODIUM CHLORIDE, SODIUM LACTATE, POTASSIUM CHLORIDE, CALCIUM CHLORIDE 600; 310; 30; 20 MG/100ML; MG/100ML; MG/100ML; MG/100ML
9 INJECTION, SOLUTION INTRAVENOUS CONTINUOUS
Status: DISCONTINUED | OUTPATIENT
Start: 2024-10-16 | End: 2024-10-16 | Stop reason: HOSPADM

## 2024-10-16 RX ORDER — FAMOTIDINE 10 MG/ML
20 INJECTION, SOLUTION INTRAVENOUS
Status: COMPLETED | OUTPATIENT
Start: 2024-10-16 | End: 2024-10-16

## 2024-10-16 RX ORDER — SODIUM CHLORIDE, SODIUM LACTATE, POTASSIUM CHLORIDE, CALCIUM CHLORIDE 600; 310; 30; 20 MG/100ML; MG/100ML; MG/100ML; MG/100ML
100 INJECTION, SOLUTION INTRAVENOUS ONCE
Status: DISCONTINUED | OUTPATIENT
Start: 2024-10-16 | End: 2024-10-16 | Stop reason: HOSPADM

## 2024-10-16 RX ORDER — DEXMEDETOMIDINE HYDROCHLORIDE 100 UG/ML
INJECTION, SOLUTION INTRAVENOUS AS NEEDED
Status: DISCONTINUED | OUTPATIENT
Start: 2024-10-16 | End: 2024-10-16 | Stop reason: SURG

## 2024-10-16 RX ORDER — LIDOCAINE HYDROCHLORIDE 20 MG/ML
INJECTION, SOLUTION INFILTRATION; PERINEURAL AS NEEDED
Status: DISCONTINUED | OUTPATIENT
Start: 2024-10-16 | End: 2024-10-16 | Stop reason: SURG

## 2024-10-16 RX ORDER — LIDOCAINE HYDROCHLORIDE AND EPINEPHRINE 10; 10 MG/ML; UG/ML
INJECTION, SOLUTION INFILTRATION; PERINEURAL AS NEEDED
Status: DISCONTINUED | OUTPATIENT
Start: 2024-10-16 | End: 2024-10-16 | Stop reason: HOSPADM

## 2024-10-16 RX ORDER — KETAMINE HYDROCHLORIDE 10 MG/ML
INJECTION, SOLUTION INTRAMUSCULAR; INTRAVENOUS AS NEEDED
Status: DISCONTINUED | OUTPATIENT
Start: 2024-10-16 | End: 2024-10-16 | Stop reason: SURG

## 2024-10-16 RX ORDER — EPHEDRINE SULFATE 50 MG/ML
INJECTION INTRAVENOUS AS NEEDED
Status: DISCONTINUED | OUTPATIENT
Start: 2024-10-16 | End: 2024-10-16 | Stop reason: SURG

## 2024-10-16 RX ORDER — LIDOCAINE HYDROCHLORIDE 10 MG/ML
0.5 INJECTION, SOLUTION EPIDURAL; INFILTRATION; INTRACAUDAL; PERINEURAL ONCE AS NEEDED
Status: DISCONTINUED | OUTPATIENT
Start: 2024-10-16 | End: 2024-10-16 | Stop reason: HOSPADM

## 2024-10-16 RX ORDER — HYDROCODONE BITARTRATE AND ACETAMINOPHEN 7.5; 325 MG/1; MG/1
1 TABLET ORAL EVERY 6 HOURS PRN
Qty: 20 TABLET | Refills: 0 | Status: SHIPPED | OUTPATIENT
Start: 2024-10-16 | End: 2024-10-25

## 2024-10-16 RX ORDER — SUCCINYLCHOLINE CHLORIDE 20 MG/ML
INJECTION INTRAMUSCULAR; INTRAVENOUS AS NEEDED
Status: DISCONTINUED | OUTPATIENT
Start: 2024-10-16 | End: 2024-10-16 | Stop reason: SURG

## 2024-10-16 RX ORDER — ONDANSETRON 2 MG/ML
4 INJECTION INTRAMUSCULAR; INTRAVENOUS ONCE AS NEEDED
Status: DISCONTINUED | OUTPATIENT
Start: 2024-10-16 | End: 2024-10-16 | Stop reason: HOSPADM

## 2024-10-16 RX ORDER — MAGNESIUM HYDROXIDE 1200 MG/15ML
LIQUID ORAL AS NEEDED
Status: DISCONTINUED | OUTPATIENT
Start: 2024-10-16 | End: 2024-10-16 | Stop reason: HOSPADM

## 2024-10-16 RX ORDER — PHENYLEPHRINE HYDROCHLORIDE 10 MG/ML
INJECTION INTRAVENOUS AS NEEDED
Status: DISCONTINUED | OUTPATIENT
Start: 2024-10-16 | End: 2024-10-16 | Stop reason: SURG

## 2024-10-16 RX ORDER — PROPOFOL 10 MG/ML
INJECTION, EMULSION INTRAVENOUS AS NEEDED
Status: DISCONTINUED | OUTPATIENT
Start: 2024-10-16 | End: 2024-10-16 | Stop reason: SURG

## 2024-10-16 RX ORDER — ONDANSETRON 2 MG/ML
4 INJECTION INTRAMUSCULAR; INTRAVENOUS ONCE AS NEEDED
Status: COMPLETED | OUTPATIENT
Start: 2024-10-16 | End: 2024-10-16

## 2024-10-16 RX ORDER — DEXAMETHASONE SODIUM PHOSPHATE 10 MG/ML
8 INJECTION INTRAMUSCULAR; INTRAVENOUS ONCE AS NEEDED
Status: COMPLETED | OUTPATIENT
Start: 2024-10-16 | End: 2024-10-16

## 2024-10-16 RX ORDER — HYDROCODONE BITARTRATE AND ACETAMINOPHEN 5; 325 MG/1; MG/1
1 TABLET ORAL ONCE AS NEEDED
Status: DISCONTINUED | OUTPATIENT
Start: 2024-10-16 | End: 2024-10-16 | Stop reason: HOSPADM

## 2024-10-16 RX ORDER — FENTANYL CITRATE 50 UG/ML
INJECTION, SOLUTION INTRAMUSCULAR; INTRAVENOUS AS NEEDED
Status: DISCONTINUED | OUTPATIENT
Start: 2024-10-16 | End: 2024-10-16 | Stop reason: SURG

## 2024-10-16 RX ORDER — HYDROCODONE BITARTRATE AND ACETAMINOPHEN 7.5; 325 MG/1; MG/1
1 TABLET ORAL ONCE AS NEEDED
Status: DISCONTINUED | OUTPATIENT
Start: 2024-10-16 | End: 2024-10-16 | Stop reason: HOSPADM

## 2024-10-16 RX ORDER — CEFAZOLIN SODIUM 500 MG/2.2ML
INJECTION, POWDER, FOR SOLUTION INTRAMUSCULAR; INTRAVENOUS AS NEEDED
Status: DISCONTINUED | OUTPATIENT
Start: 2024-10-16 | End: 2024-10-16 | Stop reason: SURG

## 2024-10-16 RX ADMIN — DEXMEDETOMIDINE 2.5 MCG: 100 INJECTION, SOLUTION, CONCENTRATE INTRAVENOUS at 10:30

## 2024-10-16 RX ADMIN — SUCCINYLCHOLINE CHLORIDE 180 MG: 20 INJECTION, SOLUTION INTRAMUSCULAR; INTRAVENOUS at 09:35

## 2024-10-16 RX ADMIN — KETAMINE HYDROCHLORIDE 5 MG: 10 INJECTION INTRAMUSCULAR; INTRAVENOUS at 10:30

## 2024-10-16 RX ADMIN — KETAMINE HYDROCHLORIDE 30 MG: 10 INJECTION INTRAMUSCULAR; INTRAVENOUS at 09:34

## 2024-10-16 RX ADMIN — KETAMINE HYDROCHLORIDE 5 MG: 10 INJECTION INTRAMUSCULAR; INTRAVENOUS at 10:44

## 2024-10-16 RX ADMIN — PHENYLEPHRINE HYDROCHLORIDE 30 MCG/MIN: 10 INJECTION INTRAVENOUS at 10:18

## 2024-10-16 RX ADMIN — DEXMEDETOMIDINE 10 MCG: 100 INJECTION, SOLUTION, CONCENTRATE INTRAVENOUS at 09:45

## 2024-10-16 RX ADMIN — DEXMEDETOMIDINE 10 MCG: 100 INJECTION, SOLUTION, CONCENTRATE INTRAVENOUS at 09:27

## 2024-10-16 RX ADMIN — PROPOFOL 200 MG: 10 INJECTION, EMULSION INTRAVENOUS at 09:34

## 2024-10-16 RX ADMIN — PHENYLEPHRINE HYDROCHLORIDE 100 MCG: 10 INJECTION INTRAVENOUS at 10:06

## 2024-10-16 RX ADMIN — EPHEDRINE SULFATE 5 MG: 50 INJECTION INTRAVENOUS at 10:18

## 2024-10-16 RX ADMIN — HYDROMORPHONE HYDROCHLORIDE 1 MG: 1 INJECTION, SOLUTION INTRAMUSCULAR; INTRAVENOUS; SUBCUTANEOUS at 09:34

## 2024-10-16 RX ADMIN — PHENYLEPHRINE HYDROCHLORIDE 100 MCG: 10 INJECTION INTRAVENOUS at 10:15

## 2024-10-16 RX ADMIN — DEXAMETHASONE SODIUM PHOSPHATE 8 MG: 10 INJECTION INTRAMUSCULAR; INTRAVENOUS at 07:46

## 2024-10-16 RX ADMIN — DEXMEDETOMIDINE 5 MCG: 100 INJECTION, SOLUTION, CONCENTRATE INTRAVENOUS at 10:00

## 2024-10-16 RX ADMIN — KETAMINE HYDROCHLORIDE 10 MG: 10 INJECTION INTRAMUSCULAR; INTRAVENOUS at 10:00

## 2024-10-16 RX ADMIN — FAMOTIDINE 20 MG: 10 INJECTION, SOLUTION INTRAVENOUS at 07:46

## 2024-10-16 RX ADMIN — PHENYLEPHRINE HYDROCHLORIDE 100 MCG: 10 INJECTION INTRAVENOUS at 10:10

## 2024-10-16 RX ADMIN — LIDOCAINE HYDROCHLORIDE 100 MG: 20 INJECTION, SOLUTION INFILTRATION; PERINEURAL at 09:34

## 2024-10-16 RX ADMIN — DEXMEDETOMIDINE 10 MCG: 100 INJECTION, SOLUTION, CONCENTRATE INTRAVENOUS at 09:40

## 2024-10-16 RX ADMIN — CEFAZOLIN 2 G: 330 INJECTION, POWDER, FOR SOLUTION INTRAMUSCULAR; INTRAVENOUS at 10:03

## 2024-10-16 RX ADMIN — FENTANYL CITRATE 50 MCG: 50 INJECTION, SOLUTION INTRAMUSCULAR; INTRAVENOUS at 10:21

## 2024-10-16 RX ADMIN — ONDANSETRON 4 MG: 2 INJECTION INTRAMUSCULAR; INTRAVENOUS at 07:46

## 2024-10-16 RX ADMIN — EPHEDRINE SULFATE 5 MG: 50 INJECTION INTRAVENOUS at 09:56

## 2024-10-16 RX ADMIN — SODIUM CHLORIDE, POTASSIUM CHLORIDE, SODIUM LACTATE AND CALCIUM CHLORIDE 9 ML/HR: 600; 310; 30; 20 INJECTION, SOLUTION INTRAVENOUS at 07:34

## 2024-10-16 RX ADMIN — DEXMEDETOMIDINE 10 MCG: 100 INJECTION, SOLUTION, CONCENTRATE INTRAVENOUS at 09:34

## 2024-10-16 NOTE — ANESTHESIA POSTPROCEDURE EVALUATION
Patient: Dangelo Song    Procedure Summary       Date: 10/16/24 Room / Location:  [facility] /  [facility]    Anesthesia Start: 0925 Anesthesia Stop: 1118    Procedure: INSERTION OF HYPOGLOSSAL NERVE NEUROSTIMULATOR ELECTRODE AND GENERATOR AND BREATHING SENSOR ELECTRODE Diagnosis: (G47.33)    Surgeons: Cory Hidalgo MD Provider: Maame Ugarte CRNA    Anesthesia Type: general ASA Status: 3            Anesthesia Type: general    Vitals  Vitals Value Taken Time   /83 10/16/24 1235   Temp 98.2 °F (36.8 °C) 10/16/24 1120   Pulse 96 10/16/24 1233   Resp 12 10/16/24 1145   SpO2 93 % 10/16/24 1233   Vitals shown include unfiled device data.        Post Anesthesia Care and Evaluation    Patient location during evaluation: bedside  Patient participation: complete - patient participated  Level of consciousness: awake and alert  Pain score: 2  Pain management: adequate    Airway patency: patent  Anesthetic complications: No anesthetic complications  PONV Status: none  Cardiovascular status: acceptable  Respiratory status: acceptable  Hydration status: acceptable    Comments: Pt's vitals are stable and pt states that he is feeling back to normal other than being a little bit sleepy. Educated patient and spouse on importance of returning to the ER if he starts having any abnormal symptoms to include chest pain, diaphoresis, nausea, lightheadedness, or any other abnormal symptoms.

## 2024-10-16 NOTE — ANESTHESIA PREPROCEDURE EVALUATION
Anesthesia Evaluation     History of anesthetic complications: Slow to awaken after colon 15 years ago. No issues since..  NPO Solid Status: > 8 hours  NPO Liquid Status: < 2 hours           Airway   Mallampati: II  TM distance: >3 FB  Neck ROM: full  Dental - normal exam     Pulmonary - normal exam   (+) a smoker Former,sleep apnea  Cardiovascular - normal exam  Exercise tolerance: good (4-7 METS)    ECG reviewed  PT is on anticoagulation therapy  Beta blocker given within 24 hours of surgery    (+) hypertension 2 medications or greater, valvular problems/murmurs (TR (mild), MR (mild)) MR, CAD, CHF (Ef 60% per echo in 2023) , hyperlipidemia      Neuro/Psych  (+) numbness    ROS Comment: Bilateral shoulders and down to hands numbness/ tingling  GI/Hepatic/Renal/Endo    (+) obesity, hiatal hernia, GERD well controlled    Musculoskeletal (-) negative ROS    Abdominal   (+) obese   Substance History - negative use     OB/GYN          Other      history of cancer (Skin) remission  Arthritis: R ankle pain.  ROS/Med Hx Other: Sip of water with meds 0630                  Anesthesia Plan    ASA 3     general     (Cardiologist note reviewed from office visit 8/24. Discussion held with patient to discuss increased risk of cardiovascular events. All questions answered and patient accepts risks associated with anesthesia for procedure.)  intravenous induction     Anesthetic plan, risks, benefits, and alternatives have been provided, discussed and informed consent has been obtained with: patient.  Pre-procedure education provided  Use of blood products discussed with patient  Consented to blood products.    Plan discussed with CRNA.      CODE STATUS:

## 2024-10-16 NOTE — ANESTHESIA PROCEDURE NOTES
Airway  Urgency: elective    Date/Time: 10/16/2024 9:37 AM  Airway not difficult    General Information and Staff    Patient location during procedure: OR    Indications and Patient Condition  Indications for airway management: airway protection    Preoxygenated: yes  Mask difficulty assessment: 3 - difficult mask (inadequate, unstable or two providers) +/- NMBA    Final Airway Details  Final airway type: endotracheal airway      Successful airway: ETT  Cuffed: yes   Successful intubation technique: direct laryngoscopy  Endotracheal tube insertion site: oral  Blade: Pugh  Blade size: 2  ETT size (mm): 7.5  Cormack-Lehane Classification: grade IIb - view of arytenoids or posterior of glottis only  Placement verified by: chest auscultation and capnometry   Cuff volume (mL): 8  Measured from: lips  ETT/EBT  to lips (cm): 24  Number of attempts at approach: 1  Assessment: lips, teeth, and gum same as pre-op and atraumatic intubation

## 2024-10-16 NOTE — OP NOTE
Preop diagnosis: Moderate obstructive sleep apnea intolerant of positive pressure therapy    Postop diagnosis: Same    Procedure: 12th cranial nerve stimulation implant (CPT 68358), with placement of chest wall respiratory sensor (CPT 0466T)    Anesthesia General    Assistant: Bean estrada    Blood loss 10 mL    Complications: None    Indications:    Description.  Patient was placed supine on the operating table where general anesthetic was administered.  Patient was positioned and draped in usual manner for hypoglossal nerve stimulation implant.     Shoulder roll was placed.  Electrodes were placed into the genioglossus and hyoglossus muscles and additional grounding lead was placed.  These were tested and noted to be appropriately positioned.     Sterile prep and drape was completed after marking of our incisions with ink.     Neck incision was made with a #15 blade.  Platysma was divided meticulously to avoid injury to the marginal branch of the facial nerve.  Once the platysma was divided we divided deep cervical fascia quite inferiorly below the margin of the 70 of the gland.  We reflected this fascia superiorly and then dissected the gland away from the digastric tendon.  We identified the digastric tendon and passed a loop under the tendon to retract it inferiorly.  We then identified the border of the mylohyoid muscle which we cleared of fascia.  We retracted the mylohyoid anteriorly.     A sizable vein was identified just inferior to the hypoglossal nerve.  We placed a vessel loop under this vein and retracted it inferiorly.  We then gently dissected fascia off the hypoglossal nerve.     Once the hypoglossal nerve was identified we used the Nam stimulator to identify appropriate branches to the genioglossus, transverse, and vertical muscles.  We then appropriately placed the cuff electrode for the hypoglossal nerve stimulator we tested the included branches several times before making our final  placement.  We flushed the electrode with saline.  We secured the electrode using the security cuff and sewed this to the digastric tendon with 2 separate 3-0 silk sutures.     A separate 5 cm incision was made in the right upper chest and dissection was carried quickly down to the pectoralis fascia.  We placed two 2-0 silk stay sutures appropriately for securing of the RPG.     We then divided the pectoralis fascia and  pectoralis muscle fibers down to external oblique.  We identified the junction of external and internal oblique.  We made a small window into the external oblique and then passed the respiratory sensor lead easily without difficulty into the space between external and internal oblique.  We then secured this lead to the external oblique fascia with 3 separate 3-0 silk sutures.  We brought the lead out between the pectoralis fibers and secured the second security point to the pectoralis fascia with two 3-0 silk sutures.     The stimulation lead was then tunneled safely in the subplatysmal plane from the upper to the lower incision.  We then placed both leads into the pulse generator and made certain that they were appropriately positioned.  We then placed the pulse generator into the pectoralis pocket.     We performed diagnostic testing on both the sensor and stimulation leads and testing was appropriate indicating good function of the device.     Once this was complete we irrigated all wounds copiously with saline and closed in 3 layers with Monocryl and then the skin with 5-0 nylon.  Appropriate dressings were applied.  Patient was awakened and return to recovery in dictation.     Note: The assistant was necessary for the technical portions of this case and was present during the entire case.  The assistant participated in closure of the wound and was necessary for retraction, implant placement, and security of the implant components during suturing and other maneuvers.  The procedure  could not have been completed safely without the assistant present.

## 2024-10-16 NOTE — H&P
[facility]   PREOPERATIVE HISTORY AND PHYSICAL    Patient Name:Dangelo Song  : 1955  MRN: [id number]  Primary Care Physician: Bradley Lind MD  Date of admission: 10/16/2024    Subjective   Subjective     Chief Complaint: preoperative evaluation    HPI  Dangelo Song is a 68 y.o. male who presents for preoperative evaluation. He is scheduled for INSERTION OF HYPOGLOSSAL NERVE NEUROSTIMULATOR ELECTRODE AND GENERATOR AND BREATHING SENSOR ELECTRODE (N/A).       Personal History     Past Medical History:   Diagnosis Date    Acid reflux     Antiplatelet or antithrombotic long-term use     plavix    Carpal tunnel syndrome     Coronary artery disease     Dr Farrell follows, lov 2024    Hyperlipidemia     Hypertension     Melanoma     Sleep apnea     cpap    Weakness of both upper extremities        Past Surgical History:   Procedure Laterality Date    ANKLE SURGERY Right     hardware for stability    CARDIAC CATHETERIZATION      last 2023    CHOLECYSTECTOMY      COLONOSCOPY  2012    ih,eh,sig tics,hp polyp    COLONOSCOPY  2017    NTEH, tics, torts, IH    ENDOSCOPY  2012    tort,hh,bilious gastric fluid,gastritis,zline irreg    HERNIA REPAIR      SLEEP ENDOSCOPY N/A 2024    Procedure: Evaluation of Sleep disordered breathing by examination of upper airway using an endoscope;  Surgeon: Cory Hidalgo MD;  [place] [facility];  Service: ENT;  Laterality: N/A;    TOTAL KNEE ARTHROPLASTY Bilateral     UPPER GASTROINTESTINAL ENDOSCOPY  2017    gastritis    WRIST SURGERY Left     fracture       Family History: His family history includes Breast cancer in his mother; Cancer in his cousin; Colon cancer in his cousin; Heart disease in his father.     Social History: He  reports that he quit smoking about 40 years ago. His smoking use included cigarettes. He has been exposed to tobacco smoke. He has never used smokeless tobacco. He reports current alcohol use. He  reports that he does not use drugs.    Home Medications:  Misc Natural Products, Testosterone Cypionate, atorvastatin, clopidogrel, colesevelam, latanoprost, losartan-hydrochlorothiazide, metoprolol succinate XL, nitroglycerin, and pantoprazole    Allergies:  He is allergic to bee venom.    Objective    Objective     Vitals:    Temp:  [97.8 °F (36.6 °C)] 97.8 °F (36.6 °C)  Heart Rate:  [71] 71  Resp:  [14] 14  BP: (139)/(89) 139/89  ENT Physical Exam:  chest and clavicle normal  Assessment & Plan   Assessment / Plan     Brief Patient Summary:  Dangelo Song is a 68 y.o. male who presents for preoperative evaluation.    * No Diagnosis Codes entered *    Active Hospital Problems:  There are no active hospital problems to display for this patient.    Plan:   Procedure(s):  INSERTION OF HYPOGLOSSAL NERVE NEUROSTIMULATOR ELECTRODE AND GENERATOR AND BREATHING SENSOR ELECTRODE    The risks, benefits, and alternatives of the procedure including but not limited to pain, numbness, nerve injury, scarring, bleeding, infection, persistent symptoms, pneumothorax and risks of the anesthesia were discussed full with the patient and questions were answered. No guarantees were made or implied.      Cory Hidalgo MD

## 2024-10-25 ENCOUNTER — HOSPITAL ENCOUNTER (OUTPATIENT)
Facility: HOSPITAL | Age: 69
Discharge: HOME | End: 2024-10-25
Payer: COMMERCIAL

## 2024-10-25 ENCOUNTER — LAB (OUTPATIENT)
Facility: HOSPITAL | Age: 69
End: 2024-10-25
Payer: COMMERCIAL

## 2024-10-25 ENCOUNTER — OFFICE VISIT (OUTPATIENT)
Dept: INTERNAL MEDICINE | Facility: CLINIC | Age: 69
End: 2024-10-25
Payer: COMMERCIAL

## 2024-10-25 VITALS
OXYGEN SATURATION: 98 % | DIASTOLIC BLOOD PRESSURE: 78 MMHG | SYSTOLIC BLOOD PRESSURE: 116 MMHG | WEIGHT: 229.6 LBS | HEART RATE: 64 BPM | BODY MASS INDEX: 32.87 KG/M2 | HEIGHT: 70 IN | TEMPERATURE: 97.5 F

## 2024-10-25 DIAGNOSIS — M54.12 CERVICAL MYELOPATHY WITH CERVICAL RADICULOPATHY: ICD-10-CM

## 2024-10-25 DIAGNOSIS — G95.9 CERVICAL MYELOPATHY WITH CERVICAL RADICULOPATHY: ICD-10-CM

## 2024-10-25 DIAGNOSIS — G62.9 NEUROPATHY: Primary | ICD-10-CM

## 2024-10-25 LAB
ALBUMIN SERPL-MCNC: 4.2 G/DL (ref 3.5–5.2)
ALBUMIN/GLOB SERPL: 1.4 G/DL
ALP SERPL-CCNC: 70 U/L (ref 39–117)
ALT SERPL W P-5'-P-CCNC: 30 U/L (ref 1–41)
ANION GAP SERPL CALCULATED.3IONS-SCNC: 13 MMOL/L (ref 5–15)
AST SERPL-CCNC: 18 U/L (ref 1–40)
BILIRUB SERPL-MCNC: 0.7 MG/DL (ref 0–1.2)
BUN SERPL-MCNC: 12 MG/DL (ref 8–23)
BUN/CREAT SERPL: 9.4 (ref 7–25)
CALCIUM SPEC-SCNC: 9.3 MG/DL (ref 8.6–10.5)
CHLORIDE SERPL-SCNC: 102 MMOL/L (ref 98–107)
CK SERPL-CCNC: 115 U/L (ref 20–200)
CO2 SERPL-SCNC: 26 MMOL/L (ref 22–29)
CREAT SERPL-MCNC: 1.27 MG/DL (ref 0.76–1.27)
EGFRCR SERPLBLD CKD-EPI 2021: 61.9 ML/MIN/1.73
GLOBULIN UR ELPH-MCNC: 2.9 GM/DL
GLUCOSE SERPL-MCNC: 96 MG/DL (ref 65–99)
POTASSIUM SERPL-SCNC: 3.9 MMOL/L (ref 3.5–5.2)
PROT SERPL-MCNC: 7.1 G/DL (ref 6–8.5)
SODIUM SERPL-SCNC: 141 MMOL/L (ref 136–145)
T4 FREE SERPL-MCNC: 1.1 NG/DL (ref 0.92–1.68)
TSH SERPL DL<=0.05 MIU/L-ACNC: 2.41 UIU/ML (ref 0.27–4.2)
VIT B12 BLD-MCNC: 495 PG/ML (ref 211–946)

## 2024-10-25 PROCEDURE — 82607 VITAMIN B-12: CPT | Performed by: STUDENT IN AN ORGANIZED HEALTH CARE EDUCATION/TRAINING PROGRAM

## 2024-10-25 PROCEDURE — 36415 COLL VENOUS BLD VENIPUNCTURE: CPT | Performed by: STUDENT IN AN ORGANIZED HEALTH CARE EDUCATION/TRAINING PROGRAM

## 2024-10-25 PROCEDURE — 82550 ASSAY OF CK (CPK): CPT | Performed by: STUDENT IN AN ORGANIZED HEALTH CARE EDUCATION/TRAINING PROGRAM

## 2024-10-25 PROCEDURE — 72040 X-RAY EXAM NECK SPINE 2-3 VW: CPT

## 2024-10-25 PROCEDURE — 99214 OFFICE O/P EST MOD 30 MIN: CPT | Performed by: STUDENT IN AN ORGANIZED HEALTH CARE EDUCATION/TRAINING PROGRAM

## 2024-10-25 PROCEDURE — 84443 ASSAY THYROID STIM HORMONE: CPT | Performed by: STUDENT IN AN ORGANIZED HEALTH CARE EDUCATION/TRAINING PROGRAM

## 2024-10-25 PROCEDURE — 80053 COMPREHEN METABOLIC PANEL: CPT | Performed by: STUDENT IN AN ORGANIZED HEALTH CARE EDUCATION/TRAINING PROGRAM

## 2024-10-25 PROCEDURE — 84439 ASSAY OF FREE THYROXINE: CPT | Performed by: STUDENT IN AN ORGANIZED HEALTH CARE EDUCATION/TRAINING PROGRAM

## 2024-10-25 NOTE — PROGRESS NOTES
"Chief Complaint  Peripheral Neuropathy    Subjective        Dangelo Song presents to [facility] PRIMARY [facility]ARE  Peripheral Neuropathy        Presents to clinic today with complaints of polyneuropathy    He states that for years he has had some degree of ongoing numbness and tingling and pain with certain maneuvers and positions of his upper extremities.  He states that largely over the past couple months it has significantly worsened, and feels like his distal extremities specifically in his hands and wrists.  He reports pain with certain hand maneuvers as well as numbness and tingling        Objective   Vital Signs:  /78   Pulse 64   Temp 97.5 °F (36.4 °C) (Temporal)   Ht 177.8 cm (70\")   Wt 104 kg (229 lb 9.6 oz)   SpO2 98%   BMI 32.94 kg/m²   Estimated body mass index is 32.94 kg/m² as calculated from the following:    Height as of this encounter: 177.8 cm (70\").    Weight as of this encounter: 104 kg (229 lb 9.6 oz).            Physical Exam  Vitals reviewed.   Constitutional:       General: He is not in acute distress.     Appearance: Normal appearance. He is not toxic-appearing.   HENT:      Head: Normocephalic and atraumatic.   Eyes:      General: No scleral icterus.     Conjunctiva/sclera: Conjunctivae normal.   Musculoskeletal:      Right forearm: No deformity.      Left forearm: No deformity.      Right hand: Decreased range of motion. Decreased strength.      Left hand: Decreased range of motion. Normal strength.      Comments: + Phalen's test as well as reproducible pain with compression of median nerve of right upper extremity    Skin:     General: Skin is warm and dry.   Neurological:      Mental Status: He is alert and oriented to person, place, and time.   Psychiatric:         Mood and Affect: Mood normal.         Behavior: Behavior normal.        Result Review :                Assessment and Plan   Diagnoses and all orders for this visit:    1. Neuropathy (Primary)  -   "   CK  -     TSH+Free T4  -     Comprehensive Metabolic Panel  -     Vitamin B12  -     EMG & Nerve Conduction Test; Future    2. Cervical myelopathy with cervical radiculopathy  -     XR Spine Cervical 2 or 3 View  -     EMG & Nerve Conduction Test; Future      Reviewed previous imaging, noted in 2022 to have severe disc space narrowing at C5-C6    Reports chronic worsening numbness and tingling and pain in upper extremities, has mixed features of both cervical radiculopathy and myelopathy, he does have some weakness of his upper arms as well as decreased dexterity.  Will plan to obtain labs to rule out reversible etiologies, as well as x-ray to start to compare from 2022.  He does have signs also consistent with carpal tunnel and suspect NCS would be beneficial    Pending imaging and nerve conduction study, will consider MRI for further evaluation of myelopathy    RTC as previously scheduled          I spent 36 minutes caring for Dangelo on this date of service. This time includes time spent by me in the following activities:preparing for the visit, reviewing tests, performing a medically appropriate examination and/or evaluation , counseling and educating the patient/family/caregiver, ordering medications, tests, or procedures, documenting information in the medical record, and independently interpreting results and communicating that information with the patient/family/caregiver  Follow Up   No follow-ups on file.  Patient was given instructions and counseling regarding his condition or for health maintenance advice. Please see specific information pulled into the AVS if appropriate.

## 2024-10-28 DIAGNOSIS — M53.9 MULTILEVEL DEGENERATIVE DISC DISEASE: ICD-10-CM

## 2024-10-28 DIAGNOSIS — M54.12 CERVICAL MYELOPATHY WITH CERVICAL RADICULOPATHY: Primary | ICD-10-CM

## 2024-10-28 DIAGNOSIS — G95.9 CERVICAL MYELOPATHY WITH CERVICAL RADICULOPATHY: Primary | ICD-10-CM

## 2024-10-29 NOTE — PROGRESS NOTES
Xray appears to show some degenerative changes. I would like him to see a spine specialist to review the xray and see if he would benefit from MRI or further evaluation. I have placed this referral and he will get contacted to see.

## 2024-11-04 DIAGNOSIS — M53.9 MULTILEVEL DEGENERATIVE DISC DISEASE: Primary | ICD-10-CM

## 2024-11-04 DIAGNOSIS — M54.12 CERVICAL MYELOPATHY WITH CERVICAL RADICULOPATHY: ICD-10-CM

## 2024-11-04 DIAGNOSIS — G95.9 CERVICAL MYELOPATHY WITH CERVICAL RADICULOPATHY: ICD-10-CM

## 2024-11-14 ENCOUNTER — TELEPHONE (OUTPATIENT)
Dept: INTERNAL MEDICINE | Facility: CLINIC | Age: 69
End: 2024-11-14
Payer: COMMERCIAL

## 2024-11-14 DIAGNOSIS — F41.9 EPISODE OF ANXIETY: Primary | ICD-10-CM

## 2024-11-14 RX ORDER — LORAZEPAM 1 MG/1
1 TABLET ORAL EVERY 8 HOURS PRN
Qty: 1 TABLET | Refills: 0 | Status: SHIPPED | OUTPATIENT
Start: 2024-11-14

## 2024-11-14 NOTE — TELEPHONE ENCOUNTER
"  Caller: Dangelo Song \"Joseph\"    Relationship: Self    Best call back number: [phone]    What medication are you requesting: SOMETHING FOR ANXIETY    What are your current symptoms: ANXIETY     Have you had these symptoms before:    [x] Yes  [] No    Have you been treated for these symptoms before:   [x] Yes  [] No    If a prescription is needed, what is your preferred pharmacy and phone number: [id number] [place], [place] - [address] [facility] [address] [facility] [phone] [facility] [phone] FX     Additional notes: PATIENT WAS GIVEN A VALIUM A FEW YEARS AGO FOR HIS MRI.    PATIENT STATED THAT HE WOULD PREFER TO BE AS SEDATED AS POSSIBLE.    PATIENT IS SCHEDULED FOR THE MRI ON DEC 3    "

## 2024-11-22 ENCOUNTER — TELEPHONE (OUTPATIENT)
Dept: INTERNAL MEDICINE | Facility: CLINIC | Age: 69
End: 2024-11-22

## 2024-11-22 ENCOUNTER — OFFICE VISIT (OUTPATIENT)
Dept: SLEEP MEDICINE | Facility: HOSPITAL | Age: 69
End: 2024-11-22
Payer: COMMERCIAL

## 2024-11-22 VITALS — HEART RATE: 96 BPM | OXYGEN SATURATION: 97 % | WEIGHT: 228 LBS | HEIGHT: 70 IN | BODY MASS INDEX: 32.64 KG/M2

## 2024-11-22 DIAGNOSIS — E66.9 OBESITY (BMI 30-39.9): ICD-10-CM

## 2024-11-22 DIAGNOSIS — G47.33 OBSTRUCTIVE SLEEP APNEA: Primary | ICD-10-CM

## 2024-11-22 DIAGNOSIS — G47.8 NON-RESTORATIVE SLEEP: ICD-10-CM

## 2024-11-22 DIAGNOSIS — R06.83 SNORING: ICD-10-CM

## 2024-11-22 DIAGNOSIS — G47.10 HYPERSOMNIA: ICD-10-CM

## 2024-11-22 DIAGNOSIS — Z78.9 INTOLERANCE OF CONTINUOUS POSITIVE AIRWAY PRESSURE (CPAP) VENTILATION: ICD-10-CM

## 2024-11-22 DIAGNOSIS — Z96.82 STATUS POST INSERTION OF HYPOGLOSSAL NERVE STIMULATOR: ICD-10-CM

## 2024-11-22 DIAGNOSIS — Z46.2 ENCOUNTER FOR INTERROGATION OF NEUROSTIMULATOR: ICD-10-CM

## 2024-11-22 PROCEDURE — G0463 HOSPITAL OUTPT CLINIC VISIT: HCPCS

## 2024-11-22 NOTE — TELEPHONE ENCOUNTER
New referral placed as well as 2mg ativan to be used.     Milv, could you make sure we resend this referral for this specific location? Thank you

## 2024-11-22 NOTE — TELEPHONE ENCOUNTER
"Caller: Dangelo Song \"Joseph\"    Relationship to patient: Self    Best call back number: [phone]     Patient is needing: PATIENT STATES THAT DR. CAVAZOS HAS SENT AN ORDER FOR HIM TO HAVE AN MRI, BUT HE CANNOT GO TO [facility] DUE TO HAVE THE CPAP ALTERNATIVE, INSPIRE.    PATIENT STATES HE REQUIRES A SPECIAL MRI MACHINE THAT [facility] HAS LOCATED ON [facility] AND PATIENT WOULD LIKE THE MRI ORDER SENT TO THAT LOCATION.  PATIENT STATES IT IS [address], [address], PHONE NUMBER  [phone] AND FAX  [phone].   PLEASE ADVISE THE PATIENT ONCE THIS NEW REFERRAL HAS BEEN SENT.       PATIENT WOULD ALSO LIKE DR. CAVAZOS TO DOUBLE THE PRESCRIPTION FOR FOR LORAZEPAM TO 2 MG INSTEAD OF 1 MG FOR THE MRI.        "

## 2024-11-22 NOTE — PROGRESS NOTES
Follow Up Sleep Disorders Center Note     Chief Complaint:  LALO     Primary Care Physician: Bradley Lind MD    Dangelo Song is a 68 y.o.male  was seen at [facility] sleep lab: 11/21/2023 for study which showed overall AHI of 29.8 events per hour.  Referred to Dr. Hidalgo for hypoglossal nerve stimulator placement.  Presents today for activation.      Implantation date: 10/16/2024     Reports no tongue pain.  No healing issues with incisions.  No pain or drainage.     Functional capacity:0.7Volts     Voltage range 0.7-1.7 V.  Start delay: 30 min  Duration time: 9 h  Pause time: 30 min    Current Medications:    Current Outpatient Medications:     atorvastatin (LIPITOR) 20 MG tablet, Take 1 tablet by mouth Daily., Disp: , Rfl:     colesevelam (WELCHOL) 625 MG tablet, Take 1 tablet by mouth 3 (Three) Times a Day., Disp: , Rfl:     latanoprost (XALATAN) 0.005 % ophthalmic solution, Administer 1 drop to both eyes Daily., Disp: , Rfl:     LORazepam (Ativan) 1 MG tablet, Take 1 tablet by mouth Every 8 (Eight) Hours As Needed for Anxiety., Disp: 1 tablet, Rfl: 0    losartan-hydrochlorothiazide (HYZAAR) 100-12.5 MG per tablet, Take 1 tablet by mouth Daily., Disp: , Rfl:     metoprolol succinate XL (TOPROL-XL) 25 MG 24 hr tablet, Take 1 tablet by mouth Daily. Take dos, Disp: , Rfl:     Misc Natural Products (NEURIVA PO), Take 3 capsules by mouth Daily. Hold 7d prior dos, Disp: , Rfl:     nitroglycerin (Nitrostat) 0.4 MG SL tablet, Place 1 tablet under the tongue Every 5 (Five) Minutes As Needed for Chest Pain., Disp: , Rfl:     pantoprazole (PROTONIX) 40 MG EC tablet, Take 1 tablet by mouth Daily. Take dos, Disp: , Rfl:     Testosterone Cypionate (DEPOTESTOTERONE CYPIONATE) 200 MG/ML injection, Inject 0.5 mL into the appropriate muscle as directed by prescriber Every 7 (Seven) Days., Disp: , Rfl: 0   also entered in Sleep Questionnaire    Patient  has a past medical history of Acid reflux, Antiplatelet or antithrombotic  "long-term use, Carpal tunnel syndrome, Coronary artery disease, Hyperlipidemia, Hypertension, Melanoma, Sleep apnea, and Weakness of both upper extremities.    Social History:    Social History     Socioeconomic History    Marital status:    Tobacco Use    Smoking status: Former     Current packs/day: 0.00     Types: Cigarettes     Quit date:      Years since quittin.9     Passive exposure: Past    Smokeless tobacco: Never   Vaping Use    Vaping status: Never Used   Substance and Sexual Activity    Alcohol use: Yes     Comment: social    Drug use: No    Sexual activity: Defer       Allergies:  Bee venom    Vital Signs:    Vitals:    24 0846   Pulse: 96   SpO2: 97%   Weight: 103 kg (228 lb)   Height: 177.8 cm (70\")     Body mass index is 32.71 kg/m².    REVIEW OF SYSTEMS.  Full review of systems available on the intake form which is scanned in the media tab.  The relevant positive are noted below  Daytime excessive sleepiness with [place] Sleepiness Scale :Total score: 7   Snoring  See scanned media      Physical exam:  Vitals:    24 0846   Pulse: 96   SpO2: 97%   Weight: 103 kg (228 lb)   Height: 177.8 cm (70\")    Body mass index is 32.71 kg/m².    HEENT: Head is atraumatic, normocephalic  Eyes: pupils are round equal and reacting to light and accommodation, conjunctiva normal  Throat: tongue rosy  RESPIRATORY SYSTEM: Regular respirations  CARDIOVASULAR SYSTEM: Regular rate  EXTREMITES: No cyanosis, clubbing  NEUROLOGICAL SYSTEM: Oriented x 3, no gross motor defects, gait normal    Impression:  1. Obstructive sleep apnea    2. Intolerance of continuous positive airway pressure (CPAP) ventilation    3. Status post insertion of hypoglossal nerve stimulator    4. Encounter for interrogation of neurostimulator    5. Hypersomnia    6. Non-restorative sleep    7. Snoring    8. Obesity (BMI 30-39.9)        Patient advised on how inspire works.  Device was tested today and activated today " together with inspire representative Bennett.  Patient control set at 0.7-1.7 V  Therapy duration 9 hours start delay 30  minutes pause time 30 minutes.  Functional amplitude resulting in tongue protrusion of the lower incisors was 0.7 V.  Patient instructed on how to operate sleep remote had a turn therapy on and off and had increased and decreased active stimulation.  Advised to use inspire nightly.  Return to clinic in 1 month for follow-up.    Britney Varela MD  Sleep Medicine  11/22/24  09:31 EST

## 2024-11-26 NOTE — TELEPHONE ENCOUNTER
"  Caller: Dangelo Song \"Joseph\"    Relationship: Self    Best call back number: [phone]    What orders are you requesting (i.e. lab or imaging): MRI OF SPINE    In what timeframe would the patient need to come in: ASAP    Where will you receive your lab/imaging services: [facility]    Additional notes: PLEASE REFAX  [phone] [facility] STATED THEY HAVE NOT RECEIVED AS OF TODAY AT APPROX 11:30 AM    PATIENT REQUESTING TO HAVE THIS DONE AT [address] [place] [address] WAY, [address], PHONE NUMBER  [phone] AND FAX  [phone].     PATIENT STATED HE WOULD LIKE TO GET IT SCHEDULED AND DONE PRIOR TO END OF YEAR DUE TO INSURANCE.    PLEASE ADVISE WHEN SENT        "

## 2024-12-13 ENCOUNTER — OFFICE VISIT (OUTPATIENT)
Dept: INTERNAL MEDICINE | Facility: CLINIC | Age: 69
End: 2024-12-13
Payer: COMMERCIAL

## 2024-12-13 VITALS
HEART RATE: 79 BPM | OXYGEN SATURATION: 98 % | HEIGHT: 70 IN | WEIGHT: 228 LBS | BODY MASS INDEX: 32.64 KG/M2 | SYSTOLIC BLOOD PRESSURE: 126 MMHG | TEMPERATURE: 97.1 F | DIASTOLIC BLOOD PRESSURE: 80 MMHG

## 2024-12-13 DIAGNOSIS — R51.9 OCCIPITAL HEADACHE: ICD-10-CM

## 2024-12-13 DIAGNOSIS — J01.00 ACUTE NON-RECURRENT MAXILLARY SINUSITIS: ICD-10-CM

## 2024-12-13 DIAGNOSIS — Z00.00 ANNUAL PHYSICAL EXAM: Primary | ICD-10-CM

## 2024-12-13 DIAGNOSIS — Z23 NEED FOR INFLUENZA VACCINATION: ICD-10-CM

## 2024-12-13 NOTE — PROGRESS NOTES
"Chief Complaint  Annual Exam    Subjective        Dangelo Song presents to [facility] PRIMARY [facility]ARE  History of Present Illness    Presents to clinic today for CPE  He does report he has had sinus congestion and cough for little over 2 weeks but for the past few months he has had lingering upper respiratory congestion.    He is doing well since last visit however he is still having some upper extremity pain and numbness and tingling and has not had MRI yet due to scheduling issues.    Otherwise he has had no other big medical changes. Continues to try and exercise and eat healthy dietary habits.     Review of Systems   Constitutional:  Negative for fatigue, fever and unexpected weight change.   HENT:  Negative for congestion and rhinorrhea.    Eyes:  Negative for photophobia and visual disturbance.   Respiratory:  Negative for chest tightness and shortness of breath.    Cardiovascular:  Negative for chest pain and palpitations.   Gastrointestinal:  Negative for abdominal pain, diarrhea and vomiting.   Endocrine: Negative for polydipsia and polyuria.   Genitourinary:  Negative for difficulty urinating, frequency and hematuria.   Musculoskeletal:  Positive for arthralgias and back pain. Negative for gait problem and joint swelling.   Skin:  Negative for color change and rash.   Neurological:  Positive for numbness and headaches. Negative for seizures, syncope and light-headedness.   Psychiatric/Behavioral:  Negative for self-injury and suicidal ideas. The patient is not nervous/anxious.          Objective   Vital Signs:  /80   Pulse 79   Temp 97.1 °F (36.2 °C) (Temporal)   Ht 177.8 cm (70\")   Wt 103 kg (228 lb)   SpO2 98%   BMI 32.71 kg/m²   Estimated body mass index is 32.71 kg/m² as calculated from the following:    Height as of this encounter: 177.8 cm (70\").    Weight as of this encounter: 103 kg (228 lb).            Physical Exam  Vitals reviewed.   Constitutional:       General: He " is not in acute distress.     Appearance: Normal appearance. He is obese.   HENT:      Head: Normocephalic and atraumatic.      Right Ear: External ear normal.      Left Ear: External ear normal.      Nose: Nose normal. No rhinorrhea.   Eyes:      General:         Right eye: No discharge.         Left eye: No discharge.      Extraocular Movements: Extraocular movements intact.      Conjunctiva/sclera: Conjunctivae normal.   Cardiovascular:      Rate and Rhythm: Normal rate and regular rhythm.      Heart sounds: Normal heart sounds.   Pulmonary:      Effort: Pulmonary effort is normal. No respiratory distress.      Breath sounds: Normal breath sounds.   Abdominal:      General: Abdomen is flat. There is no distension.      Palpations: Abdomen is soft.   Musculoskeletal:         General: No swelling or deformity. Normal range of motion.      Cervical back: Normal range of motion and neck supple.   Skin:     General: Skin is warm and dry.   Neurological:      General: No focal deficit present.      Mental Status: He is alert and oriented to person, place, and time. Mental status is at baseline.      Cranial Nerves: No cranial nerve deficit.      Motor: No weakness.   Psychiatric:         Mood and Affect: Mood normal.         Behavior: Behavior normal.        Result Review :                Assessment and Plan   Diagnoses and all orders for this visit:    1. Annual physical exam (Primary)    2. Acute non-recurrent maxillary sinusitis  -     amoxicillin-clavulanate (AUGMENTIN) 875-125 MG per tablet; Take 1 tablet by mouth 2 (Two) Times a Day for 7 days.  Dispense: 14 tablet; Refill: 0    3. Need for influenza vaccination  -     Fluzone High-Dose 65+yrs    4. Occipital headache      Immunizations: UTD, flu shot given today  Cancer screening: UTD  Metabolic Labs: UTD  Recommend maintaining a healthy lifestyle, rich in whole grains, fruits and vegetables. Limit high saturated fats and processed sugars. Maintain an active  lifestyle with goal of 150 min of moderate aerobic exercise per week      Given duration of symptoms will prescribe Augmentin for presumed ABRS    We provided him with printed MRI order as well as faxed to [facility] for MRI to be completed to should prevent further scheduling issues    RTC in about 4mo            Follow Up   Return in about 4 months (around 4/13/2025).  Patient was given instructions and counseling regarding his condition or for health maintenance advice. Please see specific information pulled into the AVS if appropriate.

## 2025-01-03 ENCOUNTER — HOSPITAL ENCOUNTER (OUTPATIENT)
Dept: NEUROLOGY | Facility: HOSPITAL | Age: 70
Discharge: HOME | End: 2025-01-03
Payer: COMMERCIAL

## 2025-01-03 ENCOUNTER — OFFICE VISIT (OUTPATIENT)
Dept: SLEEP MEDICINE | Facility: HOSPITAL | Age: 70
End: 2025-01-03
Payer: COMMERCIAL

## 2025-01-03 VITALS — WEIGHT: 228 LBS | BODY MASS INDEX: 32.64 KG/M2 | OXYGEN SATURATION: 97 % | HEART RATE: 83 BPM | HEIGHT: 70 IN

## 2025-01-03 DIAGNOSIS — G95.9 CERVICAL MYELOPATHY WITH CERVICAL RADICULOPATHY: ICD-10-CM

## 2025-01-03 DIAGNOSIS — M54.12 CERVICAL MYELOPATHY WITH CERVICAL RADICULOPATHY: ICD-10-CM

## 2025-01-03 DIAGNOSIS — Z96.82 STATUS POST INSERTION OF HYPOGLOSSAL NERVE STIMULATOR: ICD-10-CM

## 2025-01-03 DIAGNOSIS — G62.9 NEUROPATHY: ICD-10-CM

## 2025-01-03 DIAGNOSIS — E66.9 OBESITY (BMI 30-39.9): ICD-10-CM

## 2025-01-03 DIAGNOSIS — G47.33 OBSTRUCTIVE SLEEP APNEA: Primary | ICD-10-CM

## 2025-01-03 DIAGNOSIS — Z46.2 ENCOUNTER FOR INTERROGATION OF NEUROSTIMULATOR: ICD-10-CM

## 2025-01-03 DIAGNOSIS — Z78.9 INTOLERANCE OF CONTINUOUS POSITIVE AIRWAY PRESSURE (CPAP) VENTILATION: ICD-10-CM

## 2025-01-03 PROCEDURE — G0463 HOSPITAL OUTPT CLINIC VISIT: HCPCS

## 2025-01-03 PROCEDURE — 95911 NRV CNDJ TEST 9-10 STUDIES: CPT | Performed by: PSYCHIATRY & NEUROLOGY

## 2025-01-03 PROCEDURE — 95886 MUSC TEST DONE W/N TEST COMP: CPT | Performed by: PSYCHIATRY & NEUROLOGY

## 2025-01-03 NOTE — PROCEDURES
"EMG and Nerve Conduction Studies    I.      Instrument used: Neuromax 1002  II.     Please see data sheets for tabular summary of NCS and details on methods, temperatures and lab standards.   III.    EMG muscles tested for upper extremity studies include the deltoid, biceps, triceps, pronator teres, extensor digitorum communis, first dorsal interosseous and abductor pollicis brevis.    IV.   EMG muscles tested for lower extremity studies include the vastus lateralis, tibialis anterior, peroneus longus, medial gastrocnemius and extensor digitorum brevis.    V.    Additional muscles tested as needed.  Paraspinal muscles tested as needed.   VI.   Please see data sheets for tabular summary of EMG findings.   VII. The complete report includes the data sheets.      Indication: Hand numbness and tingling worse driving and worse riding his bicycle.  On 10/15/2024 he had an inspire implant with subsequent significant worsening of any neck pain with bilateral shoulder and upper arm pain.  He denies a history of diabetes or hypothyroidism.  He was diagnosed with carpal tunnel syndrome more than 10 years ago by Dr. Jameson.  He has a history also of a left forearm fracture requiring surgery and tendon repair more than 10 years ago    Ht: 70 inches  Wt: 220 pounds  HbA1C: No results found for: \"HGBA1C\"  TSH:   Lab Results   Component Value Date    TSH 2.410 10/25/2024       Technical summary:  Nerve conduction studies were obtained in both arms.  Skin temperatures were at least 32 °C measured on the palms.  The hand required warming.  Needle examination was obtained on selected muscles of both arms.    Results:  1.  Prolonged left median antidromic sensory distal latency at 3.7 ms with normal amplitude.  Normal right median antidromic sensory distal latency at 3.6 ms with normal amplitude.  2.  Normal right median orthodromic palmar sensory distal latency and amplitude.  3.  Normal ulnar antidromic sensory distal latencies with " low amplitude on the left at 13.3 µV but normal on the right.  4.  Normal median motor conduction velocities with normal distal latencies and amplitudes bilaterally.  4.  Slow left ulnar motor conduction velocity in the short segment across the elbow at 46.2 m/s with a normal velocity below the elbow.  Normal distal latency and amplitudes.  Normal absolute right ulnar motor conduction velocity in the short segment across the elbow at 50.8 m/s with much faster velocity below the elbow at 62.5 m/s.  The difference of 11.7 m/s is a mild abnormality.  6.  Needle examination of selected muscles in both arms showed normal insertional activities throughout.  There were a few large motor units in the first dorsal interosseous muscles with questionably reduced interference patterns.  Remaining muscles tested showed normal motor units and recruitment patterns.  Cervical paraspinals at C6 showed no abnormality on either side.    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  In addition there is a mild left median neuropathy at the wrist.  This combination of 3 abnormal nerves in 2 different extremities is suspicious, but not diagnostic of, a more diffuse peripheral neuropathy.  No evidence of a cervical radiculopathy on either side by this study.  Study results were discussed with the patient.    Se Kerr M.D.              Dictated utilizing Dragon dictation.

## 2025-01-03 NOTE — PROGRESS NOTES
Follow Up Sleep Disorders Center Note     Chief Complaint:  LALO     Primary Care Physician: Bradley Lind MD    Dangelo Song is a 69 y.o.male  was seen at [facility] sleep lab: 11/21/2023 for study which showed overall AHI of 29.8 events per hour.  Referred to Dr. Hidalgo for hypoglossal nerve stimulator placement.  Presents today for activation.      Implantation date: 10/16/2024  Activated 11/22/2024     Reports no tongue pain.  No healing issues with incisions.  No pain or drainage.     Functional capacity:0.7Volts     Voltage range 0.7-1.7 V.  Start delay: 30 min  Duration time: 9 h  Pause time: 30 min    1/3/2025: Currently at 1.0 V averaging 6 hours 30 minutes per night 0.4 pauses per night.    Current Medications:    Current Outpatient Medications:     atorvastatin (LIPITOR) 20 MG tablet, Take 1 tablet by mouth Daily., Disp: , Rfl:     colesevelam (WELCHOL) 625 MG tablet, Take 1 tablet by mouth 3 (Three) Times a Day., Disp: , Rfl:     COVID-19 mRNA Vac-Stefano,[employer], 30 MCG/0.3ML suspension prefilled syringe prefilled syringe, Inject 0.3 mL into the appropriate muscle as directed by prescriber., Disp: 0.3 mL, Rfl: 0    latanoprost (XALATAN) 0.005 % ophthalmic solution, Administer 1 drop to both eyes Daily., Disp: , Rfl:     losartan-hydrochlorothiazide (HYZAAR) 100-12.5 MG per tablet, Take 1 tablet by mouth Daily., Disp: , Rfl:     metoprolol succinate XL (TOPROL-XL) 25 MG 24 hr tablet, Take 1 tablet by mouth Daily. Take dos, Disp: , Rfl:     Misc Natural Products (NEURIVA PO), Take 3 capsules by mouth Daily. Hold 7d prior dos, Disp: , Rfl:     nitroglycerin (Nitrostat) 0.4 MG SL tablet, Place 1 tablet under the tongue Every 5 (Five) Minutes As Needed for Chest Pain., Disp: , Rfl:     pantoprazole (PROTONIX) 40 MG EC tablet, Take 1 tablet by mouth Daily. Take dos, Disp: , Rfl:     Testosterone Cypionate (DEPOTESTOTERONE CYPIONATE) 200 MG/ML injection, Inject 0.5 mL into the appropriate muscle as directed by  "prescriber Every 7 (Seven) Days., Disp: , Rfl: 0   also entered in Sleep Questionnaire    Patient  has a past medical history of Acid reflux, Antiplatelet or antithrombotic long-term use, Carpal tunnel syndrome, Coronary artery disease, Hyperlipidemia, Hypertension, Melanoma, Sleep apnea, and Weakness of both upper extremities.    Social History:    Social History     Socioeconomic History    Marital status:    Tobacco Use    Smoking status: Former     Current packs/day: 0.00     Types: Cigarettes     Quit date:      Years since quittin.0     Passive exposure: Past    Smokeless tobacco: Never   Vaping Use    Vaping status: Never Used   Substance and Sexual Activity    Alcohol use: Yes     Comment: social    Drug use: No    Sexual activity: Defer       Allergies:  Bee venom    Vital Signs:    Vitals:    25 0857   Pulse: 83   SpO2: 97%   Weight: 103 kg (228 lb)   Height: 177.8 cm (70\")     Body mass index is 32.71 kg/m².    REVIEW OF SYSTEMS.  Full review of systems available on the intake form which is scanned in the media tab.  The relevant positive are noted below  Daytime excessive sleepiness with [place] Sleepiness Scale :Total score: 9   Snoring  See scanned media      Physical exam:  Vitals:    25 0857   Pulse: 83   SpO2: 97%   Weight: 103 kg (228 lb)   Height: 177.8 cm (70\")    Body mass index is 32.71 kg/m².    HEENT: Head is atraumatic, normocephalic  Eyes: pupils are round equal and reacting to light and accommodation, conjunctiva normal  Throat: tongue rosy  RESPIRATORY SYSTEM: Regular respirations  CARDIOVASULAR SYSTEM: Regular rate  EXTREMITES: No cyanosis, clubbing  NEUROLOGICAL SYSTEM: Oriented x 3, no gross motor defects, gait normal    Impression:  1. Obstructive sleep apnea    2. Intolerance of continuous positive airway pressure (CPAP) ventilation    3. Status post insertion of hypoglossal nerve stimulator    4. Encounter for interrogation of neurostimulator    5. " Obesity (BMI 30-39.9)          Keep settings the same except increased directly to 40 minutes.  Doing well.  Move up as tolerable every week.  Can take longer than every week if this is more comfortable.  Waveform run with miriam Moody.  Return to clinic in 6 weeks for follow-up or sooner if needed.    Britney Varela MD  Sleep Medicine  01/03/25  09:43 EST       significant other

## 2025-01-08 DIAGNOSIS — G62.9 NEUROPATHY: Primary | ICD-10-CM

## 2025-01-30 ENCOUNTER — HOSPITAL ENCOUNTER (OUTPATIENT)
Dept: MRI IMAGING | Facility: HOSPITAL | Age: 70
Discharge: HOME | End: 2025-01-30
Admitting: STUDENT IN AN ORGANIZED HEALTH CARE EDUCATION/TRAINING PROGRAM
Payer: COMMERCIAL

## 2025-01-30 DIAGNOSIS — M54.12 CERVICAL MYELOPATHY WITH CERVICAL RADICULOPATHY: ICD-10-CM

## 2025-01-30 DIAGNOSIS — M53.9 MULTILEVEL DEGENERATIVE DISC DISEASE: ICD-10-CM

## 2025-01-30 DIAGNOSIS — G95.9 CERVICAL MYELOPATHY WITH CERVICAL RADICULOPATHY: ICD-10-CM

## 2025-01-30 PROCEDURE — 72141 MRI NECK SPINE W/O DYE: CPT

## 2025-01-31 DIAGNOSIS — M50.30 DEGENERATION OF INTERVERTEBRAL DISC OF CERVICAL REGION: Primary | ICD-10-CM

## 2025-01-31 NOTE — PROGRESS NOTES
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 I think he would benefit from trying PT or having him see a spine specialist to discuss. If he is amenable to either let me know and I will place referral

## 2025-02-12 ENCOUNTER — OFFICE VISIT (OUTPATIENT)
Dept: SLEEP MEDICINE | Facility: HOSPITAL | Age: 70
End: 2025-02-12
Payer: COMMERCIAL

## 2025-02-12 VITALS — OXYGEN SATURATION: 94 % | HEIGHT: 70 IN | HEART RATE: 51 BPM | BODY MASS INDEX: 32.93 KG/M2 | WEIGHT: 230 LBS

## 2025-02-12 DIAGNOSIS — G47.33 OBSTRUCTIVE SLEEP APNEA: Primary | ICD-10-CM

## 2025-02-12 DIAGNOSIS — Z96.82 STATUS POST INSERTION OF HYPOGLOSSAL NERVE STIMULATOR: ICD-10-CM

## 2025-02-12 DIAGNOSIS — E66.9 OBESITY (BMI 30-39.9): ICD-10-CM

## 2025-02-12 DIAGNOSIS — Z78.9 INTOLERANCE OF CONTINUOUS POSITIVE AIRWAY PRESSURE (CPAP) VENTILATION: ICD-10-CM

## 2025-02-12 DIAGNOSIS — Z46.2 ENCOUNTER FOR INTERROGATION OF NEUROSTIMULATOR: ICD-10-CM

## 2025-02-12 PROCEDURE — G0463 HOSPITAL OUTPT CLINIC VISIT: HCPCS

## 2025-02-12 PROCEDURE — 99214 OFFICE O/P EST MOD 30 MIN: CPT | Performed by: FAMILY MEDICINE

## 2025-02-12 NOTE — PROGRESS NOTES
Follow Up [facility] Note     Chief Complaint:  LALO     Primary Care Physician: Bradley Lind MD    Dangelo Song is a 69 y.o.male  was seen at [facility] sleep lab: 11/21/2023 for study which showed overall AHI of 29.8 events per hour.  Referred to Dr. Hidalgo for hypoglossal nerve stimulator placement.  Presents today for activation.      Implantation date: 10/16/2024  Activated 11/22/2024     Reports no tongue pain.  No healing issues with incisions.  No pain or drainage.     Functional capacity:0.7Volts     Voltage range 0.7-1.7 V.  Start delay: 30 min  Duration time: 9 h  Pause time: 30 min    1/3/2025: Currently at 1.0 V averaging 6 hours 30 minutes per night 0.4 pauses per night.    2/12/2025: Currently at 1.1 V.  Averaging 5 hours 46 minutes a night 0.8 pauses per night.  Some less usage since last visit because he was sick twice.  Difficult to get to level 5.  Even before sickness.    Current Medications:    Current Outpatient Medications:     atorvastatin (LIPITOR) 20 MG tablet, Take 1 tablet by mouth Daily., Disp: , Rfl:     colesevelam (WELCHOL) 625 MG tablet, Take 1 tablet by mouth 3 (Three) Times a Day., Disp: , Rfl:     COVID-19 mRNA Vac-Stefano,Pfizer, 30 MCG/0.3ML suspension prefilled syringe prefilled syringe, Inject 0.3 mL into the appropriate muscle as directed by prescriber., Disp: 0.3 mL, Rfl: 0    latanoprost (XALATAN) 0.005 % ophthalmic solution, Administer 1 drop to both eyes Daily., Disp: , Rfl:     losartan-hydrochlorothiazide (HYZAAR) 100-12.5 MG per tablet, Take 1 tablet by mouth Daily., Disp: , Rfl:     metoprolol succinate XL (TOPROL-XL) 25 MG 24 hr tablet, Take 1 tablet by mouth Daily. Take dos, Disp: , Rfl:     Misc Natural Products (NEURIVA PO), Take 3 capsules by mouth Daily. Hold 7d prior dos, Disp: , Rfl:     nitroglycerin (Nitrostat) 0.4 MG SL tablet, Place 1 tablet under the tongue Every 5 (Five) Minutes As Needed for Chest Pain., Disp: , Rfl:     Testosterone Cypionate  "(DEPOTESTOTERONE CYPIONATE) 200 MG/ML injection, Inject 0.5 mL into the appropriate muscle as directed by prescriber Every 7 (Seven) Days., Disp: , Rfl: 0   also entered in Sleep Questionnaire    Patient  has a past medical history of Acid reflux, Antiplatelet or antithrombotic long-term use, Carpal tunnel syndrome, Coronary artery disease, Hyperlipidemia, Hypertension, Melanoma, Sleep apnea, and Weakness of both upper extremities.    Social History:    Social History     Socioeconomic History    Marital status:    Tobacco Use    Smoking status: Former     Current packs/day: 0.00     Types: Cigarettes     Quit date:      Years since quittin.1     Passive exposure: Past    Smokeless tobacco: Never   Vaping Use    Vaping status: Never Used   Substance and Sexual Activity    Alcohol use: Yes     Comment: social    Drug use: No    Sexual activity: Defer       Allergies:  Bee venom    Vital Signs:    Vitals:    25 1339   Pulse: 51   SpO2: 94%   Weight: 104 kg (230 lb)   Height: 177.8 cm (70\")       Body mass index is 33 kg/m².    REVIEW OF SYSTEMS.  Full review of systems available on the intake form which is scanned in the media tab.  The relevant positive are noted below  Daytime excessive sleepiness with [place] Sleepiness Scale :Total score: 6   Snoring  See scanned media      Physical exam:  Vitals:    25 1339   Pulse: 51   SpO2: 94%   Weight: 104 kg (230 lb)   Height: 177.8 cm (70\")      Body mass index is 33 kg/m².    HEENT: Head is atraumatic, normocephalic  Eyes: pupils are round equal and reacting to light and accommodation, conjunctiva normal  Throat: tongue rosy  RESPIRATORY SYSTEM: Regular respirations  CARDIOVASULAR SYSTEM: Regular rate  EXTREMITES: No cyanosis, clubbing  NEUROLOGICAL SYSTEM: Oriented x 3, no gross motor defects, gait normal    Impression:  1. Obstructive sleep apnea    2. Intolerance of continuous positive airway pressure (CPAP) ventilation    3. Status post " insertion of hypoglossal nerve stimulator    4. Encounter for interrogation of neurostimulator    5. Obesity (BMI 30-39.9)        Pulse with to 120 change range to 0.6/1.6 V amplitude 0.6 V.  Increased directly to 60 minutes.  Waveform on with miriam Smith.  Return to clinic in 6 weeks for follow-up or sooner if needed.    Britney Varela MD  Sleep Medicine  02/12/25  14:00 EST

## 2025-03-28 ENCOUNTER — OFFICE VISIT (OUTPATIENT)
Dept: SLEEP MEDICINE | Facility: HOSPITAL | Age: 70
End: 2025-03-28
Payer: COMMERCIAL

## 2025-03-28 VITALS — OXYGEN SATURATION: 96 % | HEIGHT: 70 IN | HEART RATE: 80 BPM | BODY MASS INDEX: 32.93 KG/M2 | WEIGHT: 230 LBS

## 2025-03-28 DIAGNOSIS — E66.9 OBESITY (BMI 30-39.9): ICD-10-CM

## 2025-03-28 DIAGNOSIS — Z46.2 ENCOUNTER FOR INTERROGATION OF NEUROSTIMULATOR: ICD-10-CM

## 2025-03-28 DIAGNOSIS — Z96.82 STATUS POST INSERTION OF HYPOGLOSSAL NERVE STIMULATOR: ICD-10-CM

## 2025-03-28 DIAGNOSIS — G47.33 OBSTRUCTIVE SLEEP APNEA: Primary | ICD-10-CM

## 2025-03-28 DIAGNOSIS — Z78.9 INTOLERANCE OF CONTINUOUS POSITIVE AIRWAY PRESSURE (CPAP) VENTILATION: ICD-10-CM

## 2025-03-28 PROCEDURE — 99213 OFFICE O/P EST LOW 20 MIN: CPT | Performed by: FAMILY MEDICINE

## 2025-03-28 PROCEDURE — G0463 HOSPITAL OUTPT CLINIC VISIT: HCPCS

## 2025-03-28 NOTE — PROGRESS NOTES
Follow Up Sleep Disorders Center Note     Chief Complaint:  LALO     Primary Care Physician: Bradley Lind MD    Dangelo Song is a 69 y.o.male  was seen at [facility] sleep lab: 11/21/2023 for study which showed overall AHI of 29.8 events per hour.  Referred to Dr. Hidalgo for hypoglossal nerve stimulator placement.  Presents today for activation.      Implantation date: 10/16/2024  Activated 11/22/2024     Reports no tongue pain.  No healing issues with incisions.  No pain or drainage.     Functional capacity:0.7Volts     Voltage range 0.7-1.7 V.  Start delay: 30 min  Duration time: 9 h  Pause time: 30 min    1/3/2025: Currently at 1.0 V averaging 6 hours 30 minutes per night 0.4 pauses per night.    2/12/2025: Currently at 1.1 V.  Averaging 5 hours 46 minutes a night 0.8 pauses per night.  Some less usage since last visit because he was sick twice.  Difficult to get to level 5.  Even before sickness.    3/28/2025: At last visit due to possible change of 120 given the range of 0.6-1.6 V.  Today patient is averaging 5 hours 28 minutes per night 0.7 pauses per night at 0.9 V.  Level 4.  Great improvement in quality of sleep.  Is unsure if stimulation is waking him around 5:30 AM or if he is just more well rested and does not need to sleep as much.  Less naps while on the road.    Current Medications:    Current Outpatient Medications:     atorvastatin (LIPITOR) 20 MG tablet, Take 1 tablet by mouth Daily., Disp: , Rfl:     colesevelam (WELCHOL) 625 MG tablet, Take 1 tablet by mouth 3 (Three) Times a Day., Disp: , Rfl:     COVID-19 mRNA Vac-Stefano,Pfizer, 30 MCG/0.3ML suspension prefilled syringe prefilled syringe, Inject 0.3 mL into the appropriate muscle as directed by prescriber., Disp: 0.3 mL, Rfl: 0    latanoprost (XALATAN) 0.005 % ophthalmic solution, Administer 1 drop to both eyes Daily., Disp: , Rfl:     losartan-hydrochlorothiazide (HYZAAR) 100-12.5 MG per tablet, Take 1 tablet by mouth Daily., Disp: , Rfl:      "metoprolol succinate XL (TOPROL-XL) 25 MG 24 hr tablet, Take 1 tablet by mouth Daily. Take dos, Disp: , Rfl:     Misc Natural Products (NEURIVA PO), Take 3 capsules by mouth Daily. Hold 7d prior dos, Disp: , Rfl:     nitroglycerin (Nitrostat) 0.4 MG SL tablet, Place 1 tablet under the tongue Every 5 (Five) Minutes As Needed for Chest Pain., Disp: , Rfl:     Testosterone Cypionate (DEPOTESTOTERONE CYPIONATE) 200 MG/ML injection, Inject 0.5 mL into the appropriate muscle as directed by prescriber Every 7 (Seven) Days., Disp: , Rfl: 0   also entered in Sleep Questionnaire    Patient  has a past medical history of Acid reflux, Antiplatelet or antithrombotic long-term use, Carpal tunnel syndrome, Coronary artery disease, Hyperlipidemia, Hypertension, Melanoma, Sleep apnea, and Weakness of both upper extremities.    Social History:    Social History     Socioeconomic History    Marital status:    Tobacco Use    Smoking status: Former     Current packs/day: 0.00     Types: Cigarettes     Quit date:      Years since quittin.2     Passive exposure: Past    Smokeless tobacco: Never   Vaping Use    Vaping status: Never Used   Substance and Sexual Activity    Alcohol use: Yes     Comment: social    Drug use: No    Sexual activity: Defer       Allergies:  Bee venom    Vital Signs:    Vitals:    25 0916   Pulse: 80   SpO2: 96%   Weight: 104 kg (230 lb)   Height: 177.8 cm (70\")       Body mass index is 33 kg/m².    REVIEW OF SYSTEMS.  Full review of systems available on the intake form which is scanned in the media tab.  The relevant positive are noted below  Daytime excessive sleepiness with [place] Sleepiness Scale :Total score: 2   Snoring  See scanned media      Physical exam:  Vitals:    25 0916   Pulse: 80   SpO2: 96%   Weight: 104 kg (230 lb)   Height: 177.8 cm (70\")      Body mass index is 33 kg/m².    HEENT: Head is atraumatic, normocephalic  Eyes: pupils are round equal and reacting to light " and accommodation, conjunctiva normal  Throat: tongue rosy  RESPIRATORY SYSTEM: Regular respirations  CARDIOVASULAR SYSTEM: Regular rate  EXTREMITES: No cyanosis, clubbing  NEUROLOGICAL SYSTEM: Oriented x 3, no gross motor defects, gait normal    Impression:  1. Obstructive sleep apnea    2. Intolerance of continuous positive airway pressure (CPAP) ventilation    3. Status post insertion of hypoglossal nerve stimulator    4. Encounter for interrogation of neurostimulator    5. Obesity (BMI 30-39.9)      Keep settings the same with pulse with 120 and range of 0.6-1.6 V currently at 0.9 V.  Follow-up HST.  Waveform on with miriam Hills.    Britney Varela MD  Sleep Medicine  03/28/25  09:46 EDT

## 2025-04-18 ENCOUNTER — OFFICE VISIT (OUTPATIENT)
Dept: INTERNAL MEDICINE | Facility: CLINIC | Age: 70
End: 2025-04-18
Payer: COMMERCIAL

## 2025-04-18 VITALS
BODY MASS INDEX: 32.41 KG/M2 | HEART RATE: 86 BPM | DIASTOLIC BLOOD PRESSURE: 80 MMHG | SYSTOLIC BLOOD PRESSURE: 128 MMHG | WEIGHT: 226.4 LBS | HEIGHT: 70 IN | OXYGEN SATURATION: 96 %

## 2025-04-18 DIAGNOSIS — E78.5 HYPERLIPIDEMIA, UNSPECIFIED HYPERLIPIDEMIA TYPE: ICD-10-CM

## 2025-04-18 DIAGNOSIS — G62.9 NEUROPATHY: ICD-10-CM

## 2025-04-18 DIAGNOSIS — E66.811 CLASS 1 OBESITY WITH BODY MASS INDEX (BMI) OF 32.0 TO 32.9 IN ADULT, UNSPECIFIED OBESITY TYPE, UNSPECIFIED WHETHER SERIOUS COMORBIDITY PRESENT: ICD-10-CM

## 2025-04-18 DIAGNOSIS — G47.33 OBSTRUCTIVE SLEEP APNEA: ICD-10-CM

## 2025-04-18 DIAGNOSIS — R73.03 PRE-DIABETES: ICD-10-CM

## 2025-04-18 DIAGNOSIS — I10 HYPERTENSION, UNSPECIFIED TYPE: ICD-10-CM

## 2025-04-18 DIAGNOSIS — M50.30 DEGENERATION OF INTERVERTEBRAL DISC OF CERVICAL REGION: Primary | ICD-10-CM

## 2025-04-18 PROCEDURE — 99214 OFFICE O/P EST MOD 30 MIN: CPT | Performed by: STUDENT IN AN ORGANIZED HEALTH CARE EDUCATION/TRAINING PROGRAM

## 2025-04-18 RX ORDER — CLOPIDOGREL BISULFATE 75 MG/1
TABLET ORAL
COMMUNITY
Start: 2025-04-03

## 2025-04-19 NOTE — PROGRESS NOTES
"Chief Complaint  No chief complaint on file.    Subjective        Dangelo Song presents to [facility]  Peripheral Neuropathy      Presents to [facility] today for follow up     Since last visit, he is doing well. He has since went to PT for his cervical radiculopathy and numbness and reports he has had significant improvement in functional activity. He continues to do PT exercises at home. He has scheduled follow up with neurology however not scheduled until August, not sure if he needs to see them given improvement and EMG/NCS results        Objective   Vital Signs:  /80   Pulse 86   Ht 177.8 cm (70\")   Wt 103 kg (226 lb 6.4 oz)   SpO2 96%   BMI 32.49 kg/m²   Estimated body mass index is 32.49 kg/m² as calculated from the following:    Height as of this encounter: 177.8 cm (70\").    Weight as of this encounter: 103 kg (226 lb 6.4 oz).            Physical Exam  Vitals reviewed.   Constitutional:       General: He is not in acute distress.     Appearance: Normal appearance. He is not toxic-appearing.   HENT:      Head: Normocephalic and atraumatic.   Eyes:      General: No scleral icterus.     Conjunctiva/sclera: Conjunctivae normal.   Skin:     General: Skin is warm and dry.   Neurological:      Mental Status: He is alert and oriented to person, place, and time.   Psychiatric:         Mood and Affect: Mood normal.         Behavior: Behavior normal.        Result Review :                Assessment and Plan   Diagnoses and all orders for this visit:    1. Degeneration of intervertebral disc of cervical region (Primary)    2. Neuropathy    3. Hypertension, unspecified type  -     Comprehensive Metabolic Panel; Future    4. Hyperlipidemia, unspecified hyperlipidemia type  -     Lipid Panel With / Chol / HDL Ratio; Future    5. Obstructive sleep apnea    6. Class 1 obesity with body mass index (BMI) of 32.0 to 32.9 in adult, unspecified obesity type, unspecified whether serious " comorbidity present  -     Comprehensive Metabolic Panel; Future  -     Hemoglobin A1c; Future  -     Lipid Panel With / Chol / HDL Ratio; Future  -     TSH Rfx On Abnormal To Free T4; Future    7. Pre-diabetes  -     Hemoglobin A1c; Future      Significant improvement in neuropathy vs cervical radiculopathy after PT. Given EMG/NCS is largely unremarkable for any significant neurological pathology, and I suspect his symptoms are more from his cervical disc disease given improvement s/p PT, I am unsure if he needs to follow up with neurology but I do think he could benefit from neurosurgery. Referral was placed at last visit but has not been scheduled so I will coordinate to see if he can at least establish to review imaging    We did discuss his comorbidities and possible GLP1 medication for associated obesity and inability to lose weight. Given his LALO, known CAD/ASCVD, and inability to lose weight despite exercise and healthy dietary habits we will discuss at upcoming OV after he tracks calories and continues exercise    RTC in 4mo, labs prior         Follow Up   Return in about 4 months (around 8/18/2025) for Lab before FU.  Patient was given instructions and counseling regarding his condition or for health maintenance advice. Please see specific information pulled into the AVS if appropriate.

## 2025-05-01 ENCOUNTER — TELEPHONE (OUTPATIENT)
Dept: INTERNAL MEDICINE | Facility: CLINIC | Age: 70
End: 2025-05-01
Payer: COMMERCIAL

## 2025-05-01 DIAGNOSIS — K21.9 GASTROESOPHAGEAL REFLUX DISEASE, UNSPECIFIED WHETHER ESOPHAGITIS PRESENT: Primary | ICD-10-CM

## 2025-05-01 RX ORDER — PANTOPRAZOLE SODIUM 40 MG/1
40 TABLET, DELAYED RELEASE ORAL DAILY
Qty: 90 TABLET | Refills: 1 | Status: SHIPPED | OUTPATIENT
Start: 2025-05-01

## 2025-05-01 NOTE — TELEPHONE ENCOUNTER
"  Caller: Dangelo Song \"Joseph\"    Relationship: Self    Best call back number: [phone]     What medication are you requesting: PANTOPRAZOLE     What are your current symptoms: HIATAL HERNIA      If a prescription is needed, what is your preferred pharmacy and phone number: [facility] [place], [place] - [address] [place] [address] [phone] [facility] [phone] [facility]     Additional notes: NOT ON MEDLIST          "

## 2025-05-15 DIAGNOSIS — I25.10 ATHEROSCLEROSIS OF CORONARY ARTERY OF NATIVE HEART, UNSPECIFIED VESSEL OR LESION TYPE, UNSPECIFIED WHETHER ANGINA PRESENT: ICD-10-CM

## 2025-05-15 DIAGNOSIS — I10 HYPERTENSION, UNSPECIFIED TYPE: Primary | ICD-10-CM

## 2025-05-15 DIAGNOSIS — E78.5 HYPERLIPIDEMIA, UNSPECIFIED HYPERLIPIDEMIA TYPE: ICD-10-CM

## 2025-05-15 NOTE — TELEPHONE ENCOUNTER
Hey so I got a refill request from [facility] for these medication asking that they would be 90 day. I went ahead and set them to 90 day with no refills, please let me know if I need to change anything.

## 2025-05-20 RX ORDER — ATORVASTATIN CALCIUM 20 MG/1
20 TABLET, FILM COATED ORAL DAILY
Qty: 90 TABLET | Refills: 1 | Status: SHIPPED | OUTPATIENT
Start: 2025-05-20

## 2025-05-20 RX ORDER — METOPROLOL SUCCINATE 25 MG/1
25 TABLET, EXTENDED RELEASE ORAL DAILY
Qty: 90 TABLET | Refills: 1 | Status: SHIPPED | OUTPATIENT
Start: 2025-05-20

## 2025-05-20 RX ORDER — CLOPIDOGREL BISULFATE 75 MG/1
75 TABLET ORAL DAILY
Qty: 90 TABLET | Refills: 1 | Status: SHIPPED | OUTPATIENT
Start: 2025-05-20

## 2025-05-20 RX ORDER — LOSARTAN POTASSIUM AND HYDROCHLOROTHIAZIDE 12.5; 1 MG/1; MG/1
1 TABLET ORAL DAILY
Qty: 90 TABLET | Refills: 1 | Status: SHIPPED | OUTPATIENT
Start: 2025-05-20

## 2025-05-22 ENCOUNTER — TELEPHONE (OUTPATIENT)
Dept: INTERNAL MEDICINE | Facility: CLINIC | Age: 70
End: 2025-05-22

## 2025-05-22 NOTE — TELEPHONE ENCOUNTER
Caller: MIGUEL GONZALEZ    Relationship:     Best call back number: [phone]     What was the call regarding: PATIENT INSURANCE CALLING TO VERIFY IF THE PATIENT HAS ANY CHRONIC ILLNESSES. SHE STATES THAT THE INFORMATION HAS BEEN FAXED AS WELL.

## 2025-05-22 NOTE — TELEPHONE ENCOUNTER
Yes I believe I completed this formed and signed it, but he has multiple chronic conditions including hypertension, CAD, LALO

## 2025-06-10 NOTE — PROGRESS NOTES
Follow Up Sleep Disorders Center Note     Chief Complaint:  LALO     Primary Care Physician: Bradley Lind MD    Dangelo Song is a 69 y.o.male  was seen at [facility] sleep lab: 11/21/2023 for study which showed overall AHI of 29.8 events per hour.  Referred to Dr. Hidalgo for hypoglossal nerve stimulator placement.  Presents today for activation.      Implantation date: 10/16/2024  Activated 11/22/2024     Reports no tongue pain.  No healing issues with incisions.  No pain or drainage.     Functional capacity:0.7Volts     Voltage range 0.7-1.7 V.  Start delay: 30 min  Duration time: 9 h  Pause time: 30 min    1/3/2025: Currently at 1.0 V averaging 6 hours 30 minutes per night 0.4 pauses per night.    2/12/2025: Currently at 1.1 V.  Averaging 5 hours 46 minutes a night 0.8 pauses per night.  Some less usage since last visit because he was sick twice.  Difficult to get to level 5.  Even before sickness.    3/28/2025: At last visit due to possible change of 120 given the range of 0.6-1.6 V.  Today patient is averaging 5 hours 28 minutes per night 0.7 pauses per night at 0.9 V.  Level 4.  Great improvement in quality of sleep.  Is unsure if stimulation is waking him around 5:30 AM or if he is just more well rested and does not need to sleep as much.  Less naps while on the road.    6/10/2025: At last visit patient was kept at hospital 120 insane ranges 0.6/1.6 V.  Advised HST. Averaging 4 hours 45 minutes and night 1.5 pauses per night currently at 0.8 V.  At last visit was at 0.9 V. Averaging 26 hours per week. 4h 44 min per night 1.6 pauses per night. Does not think he got miuch data on HST; likely failed HST. Having comfort issues. Effecting how much sleep he is getting.     Current Medications:    Current Outpatient Medications:     atorvastatin (LIPITOR) 20 MG tablet, Take 1 tablet by mouth Daily., Disp: 90 tablet, Rfl: 1    clopidogrel (PLAVIX) 75 MG tablet, Take 1 tablet by mouth Daily., Disp: 90 tablet, Rfl: 1     "colesevelam (WELCHOL) 625 MG tablet, Take 1 tablet by mouth 3 (Three) Times a Day., Disp: , Rfl:     latanoprost (XALATAN) 0.005 % ophthalmic solution, Administer 1 drop to both eyes Daily., Disp: , Rfl:     losartan-hydrochlorothiazide (HYZAAR) 100-12.5 MG per tablet, Take 1 tablet by mouth Daily., Disp: 90 tablet, Rfl: 1    metoprolol succinate XL (TOPROL-XL) 25 MG 24 hr tablet, Take 1 tablet by mouth Daily. Take dos, Disp: 90 tablet, Rfl: 1    nitroglycerin (Nitrostat) 0.4 MG SL tablet, Place 1 tablet under the tongue Every 5 (Five) Minutes As Needed for Chest Pain., Disp: , Rfl:     pantoprazole (Protonix) 40 MG EC tablet, Take 1 tablet by mouth Daily., Disp: 90 tablet, Rfl: 1    Testosterone Cypionate (DEPOTESTOTERONE CYPIONATE) 200 MG/ML injection, Inject 0.5 mL into the appropriate muscle as directed by prescriber Every 7 (Seven) Days., Disp: , Rfl: 0   also entered in Sleep Questionnaire    Patient  has a past medical history of Acid reflux, Antiplatelet or antithrombotic long-term use, Carpal tunnel syndrome, Coronary artery disease, Hyperlipidemia, Hypertension, Melanoma, Sleep apnea, and Weakness of both upper extremities.    Social History:    Social History     Socioeconomic History    Marital status:    Tobacco Use    Smoking status: Former     Current packs/day: 0.00     Types: Cigarettes     Quit date:      Years since quittin.4     Passive exposure: Past    Smokeless tobacco: Never   Vaping Use    Vaping status: Never Used   Substance and Sexual Activity    Alcohol use: Yes     Comment: social    Drug use: No    Sexual activity: Defer       Allergies:  Bee venom    Vital Signs:    Vitals:    25 0942   Pulse: 95   SpO2: 96%   Weight: 102 kg (225 lb)   Height: 177.8 cm (70\")         Body mass index is 32.28 kg/m².    REVIEW OF SYSTEMS.  Full review of systems available on the intake form which is scanned in the media tab.  The relevant positive are noted below  Daytime excessive " "sleepiness with [place] Sleepiness Scale :Total score: 5   Snoring  See scanned media      Physical exam:  Vitals:    06/11/25 0942   Pulse: 95   SpO2: 96%   Weight: 102 kg (225 lb)   Height: 177.8 cm (70\")        Body mass index is 32.28 kg/m².    HEENT: Head is atraumatic, normocephalic  Eyes: pupils are round equal and reacting to light and accommodation, conjunctiva normal  Throat: tongue rosy  RESPIRATORY SYSTEM: Regular respirations  CARDIOVASULAR SYSTEM: Regular rate  EXTREMITES: No cyanosis, clubbing  NEUROLOGICAL SYSTEM: Oriented x 3, no gross motor defects, gait normal    Impression:  1. Obstructive sleep apnea    2. Intolerance of continuous positive airway pressure (CPAP) ventilation    3. Status post insertion of hypoglossal nerve stimulator    4. Encounter for interrogation of neurostimulator    5. Obesity (BMI 30-39.9)      Change PW back to 90. Change electrode configuration to off minus off. New range of 0.3-1.3 V new amplitude of 0.3 V. Waveform run with miriam Hills. RTC in 6 weeks for follow up or sooner if needed.     Britney Varela MD  Sleep Medicine  06/11/25  10:16 EDT      "

## 2025-06-11 ENCOUNTER — OFFICE VISIT (OUTPATIENT)
Dept: SLEEP MEDICINE | Facility: HOSPITAL | Age: 70
End: 2025-06-11
Payer: COMMERCIAL

## 2025-06-11 VITALS — HEART RATE: 95 BPM | WEIGHT: 225 LBS | BODY MASS INDEX: 32.21 KG/M2 | OXYGEN SATURATION: 96 % | HEIGHT: 70 IN

## 2025-06-11 DIAGNOSIS — Z96.82 STATUS POST INSERTION OF HYPOGLOSSAL NERVE STIMULATOR: ICD-10-CM

## 2025-06-11 DIAGNOSIS — Z78.9 INTOLERANCE OF CONTINUOUS POSITIVE AIRWAY PRESSURE (CPAP) VENTILATION: ICD-10-CM

## 2025-06-11 DIAGNOSIS — G47.33 OBSTRUCTIVE SLEEP APNEA: Primary | ICD-10-CM

## 2025-06-11 DIAGNOSIS — E66.9 OBESITY (BMI 30-39.9): ICD-10-CM

## 2025-06-11 DIAGNOSIS — Z46.2 ENCOUNTER FOR INTERROGATION OF NEUROSTIMULATOR: ICD-10-CM

## 2025-06-11 PROCEDURE — G0463 HOSPITAL OUTPT CLINIC VISIT: HCPCS

## 2025-06-11 PROCEDURE — 99214 OFFICE O/P EST MOD 30 MIN: CPT | Performed by: FAMILY MEDICINE

## 2025-06-20 ENCOUNTER — TELEPHONE (OUTPATIENT)
Dept: INTERNAL MEDICINE | Facility: CLINIC | Age: 70
End: 2025-06-20
Payer: COMMERCIAL

## 2025-06-20 RX ORDER — SCOPOLAMINE 1 MG/3D
1 PATCH, EXTENDED RELEASE TRANSDERMAL
Qty: 4 EACH | Refills: 0 | Status: CANCELLED | OUTPATIENT
Start: 2025-06-20

## 2025-06-20 RX ORDER — SCOPOLAMINE 1 MG/3D
1 PATCH, EXTENDED RELEASE TRANSDERMAL
Qty: 4 EACH | Refills: 0 | Status: SHIPPED | OUTPATIENT
Start: 2025-06-20

## 2025-06-20 NOTE — TELEPHONE ENCOUNTER
Patient wants scopolamine patches and no he has never taken anything except a dramamine years ago.

## 2025-06-20 NOTE — TELEPHONE ENCOUNTER
"Caller: Dangelo Song \"Joseph\"    Relationship: Self    Best call back number: [phone] (Mobile)     What medication are you requesting: DRAMAMINE PATCHES     What are your current symptoms: MOTION SICKNESS     How long have you been experiencing symptoms:      Have you had these symptoms before:    [] Yes  [] No    Have you been treated for these symptoms before:   [] Yes  [] No    If a prescription is needed, what is your preferred pharmacy and phone number:  [id number] [address] [facility] [address] [facility] [phone] [facility] [phone]      Additional notes:PATIENT CALLED TO REQUEST NEW PRESCRIPTION FOR SOME DRAMAMINE FOR HIS MOTION SICKNESS ON THE CRUISE.  PATIENT STATES THAT HE WILL BE LEAVING TOWN IN THE MORNING AND NEEDS TO HAVE THIS MEDICATION WITH HIM.         THANKS  "

## 2025-07-27 ENCOUNTER — HOSPITAL ENCOUNTER (EMERGENCY)
Facility: HOSPITAL | Age: 70
Discharge: HOME | End: 2025-07-27
Attending: EMERGENCY MEDICINE | Admitting: EMERGENCY MEDICINE
Payer: COMMERCIAL

## 2025-07-27 ENCOUNTER — APPOINTMENT (OUTPATIENT)
Dept: CT IMAGING | Facility: HOSPITAL | Age: 70
End: 2025-07-27
Payer: COMMERCIAL

## 2025-07-27 VITALS
SYSTOLIC BLOOD PRESSURE: 143 MMHG | DIASTOLIC BLOOD PRESSURE: 95 MMHG | TEMPERATURE: 97.6 F | HEART RATE: 64 BPM | OXYGEN SATURATION: 95 % | RESPIRATION RATE: 20 BRPM

## 2025-07-27 DIAGNOSIS — M54.2 NECK PAIN: Primary | ICD-10-CM

## 2025-07-27 DIAGNOSIS — M47.812 OSTEOARTHRITIS OF CERVICAL SPINE, UNSPECIFIED SPINAL OSTEOARTHRITIS COMPLICATION STATUS: ICD-10-CM

## 2025-07-27 PROCEDURE — 63710000001 PREDNISONE PER 1 MG: Performed by: EMERGENCY MEDICINE

## 2025-07-27 PROCEDURE — 99284 EMERGENCY DEPT VISIT MOD MDM: CPT

## 2025-07-27 PROCEDURE — 72125 CT NECK SPINE W/O DYE: CPT

## 2025-07-27 RX ORDER — METHOCARBAMOL 500 MG/1
500 TABLET, FILM COATED ORAL 4 TIMES DAILY PRN
Qty: 15 TABLET | Refills: 0 | Status: SHIPPED | OUTPATIENT
Start: 2025-07-27 | End: 2025-08-01

## 2025-07-27 RX ORDER — PREDNISONE 20 MG/1
60 TABLET ORAL ONCE
Status: COMPLETED | OUTPATIENT
Start: 2025-07-27 | End: 2025-07-27

## 2025-07-27 RX ORDER — PREDNISONE 20 MG/1
TABLET ORAL
Qty: 10 TABLET | Refills: 0 | Status: SHIPPED | OUTPATIENT
Start: 2025-07-27 | End: 2025-07-29 | Stop reason: SDUPTHER

## 2025-07-27 RX ADMIN — PREDNISONE 60 MG: 20 TABLET ORAL at 13:15

## 2025-07-27 NOTE — ED PROVIDER NOTES
EMERGENCY DEPARTMENT ENCOUNTER  Room Number:  22/22  PCP: Bradley Lind MD  Independent Historians: Patient      HPI:  Chief Complaint: had concerns including Neck Pain.     A complete HPI/ROS/PMH/PSH/SH/FH are unobtainable due to: EM_Unobtainable History : None    Chronic or social conditions impacting patient care (Social Determinants of Health): None      Context: Dangelo Song is a 69 y.o. male with a medical history of hypertension, hyperlipidemia, nonischemic cardiomyopathy, CAD and obesity as well as neck pain who presents to the ED c/o acute exacerbation of neck pain over the last 4 days.  Patient was working in his home over the last few days preparing for a yard sale.  He had difficulty sleeping last night because of pain that comes intermittently and spasms and hurts quite a bit and will ease up intermittently.  He denies any new focal numbness or weakness of the upper or lower extremities.  Of note, the patient does report a fall from the tailgate of his truck back in the end of May or beginning of June where he felt some cracking and popping but did not seek care at that time and been doing reasonably well since then.  Today he denies any headache, chest pain or dyspnea with this.  No fevers reported.      Review of prior external notes (non-ED) -and- Review of prior external test results outside of this encounter:  MRI cervical spine 1/30/2025 revealed advanced degenerative changes especially at C5-C6 and moderate left C2-C3 foraminal narrowing.      PAST MEDICAL HISTORY  Active Ambulatory Problems     Diagnosis Date Noted    Benign hypertension 09/12/2016    Hyperlipidemia 06/05/2020    Male hypogonadism 11/02/2018    Obstructive sleep apnea 09/12/2016    Nonischemic congestive cardiomyopathy 07/11/2023    Coronary atherosclerosis 06/05/2020    Class 1 obesity due to excess calories without serious comorbidity with body mass index (BMI) of 32.0 to 32.9 in adult 08/13/2024     Resolved Ambulatory  Problems     Diagnosis Date Noted    No Resolved Ambulatory Problems     Past Medical History:   Diagnosis Date    Acid reflux     Antiplatelet or antithrombotic long-term use     Carpal tunnel syndrome     Coronary artery disease     Hypertension     Melanoma     Sleep apnea     Weakness of both upper extremities          PAST SURGICAL HISTORY  Past Surgical History:   Procedure Laterality Date    ANKLE SURGERY Right     hardware for stability    CARDIAC CATHETERIZATION      last 2023    CHOLECYSTECTOMY      COLONOSCOPY  2012    ih,eh,sig tics,hp polyp    COLONOSCOPY  2017    NTEH, tics, torts, IH    ENDOSCOPY  2012    tort,hh,bilious gastric fluid,gastritis,zline irreg    HERNIA REPAIR      SLEEP ENDOSCOPY N/A 2024    Procedure: Evaluation of Sleep disordered breathing by examination of upper airway using an endoscope;  Surgeon: Cory Hidalgo MD;  Location: [facility] MAIN OR;  Service: ENT;  Laterality: N/A;    TOTAL KNEE ARTHROPLASTY Bilateral     UPPER GASTROINTESTINAL ENDOSCOPY  2017    gastritis    WRIST SURGERY Left     fracture         FAMILY HISTORY  Family History   Problem Relation Age of Onset    Breast cancer Mother     Heart disease Father     Colon cancer Cousin     Cancer Cousin         prostate         SOCIAL HISTORY  Social History     Socioeconomic History    Marital status:    Tobacco Use    Smoking status: Former     Current packs/day: 0.00     Types: Cigarettes     Quit date:      Years since quittin.5     Passive exposure: Past    Smokeless tobacco: Never   Vaping Use    Vaping status: Never Used   Substance and Sexual Activity    Alcohol use: Yes     Comment: social    Drug use: No    Sexual activity: Defer         ALLERGIES  Bee venom      REVIEW OF SYSTEMS  Review of Systems  Included in HPI  All systems reviewed and negative except for those discussed in HPI.      PHYSICAL EXAM    I have reviewed the triage vital signs and nursing  notes.    ED Triage Vitals   Temp Heart Rate Resp BP SpO2   07/27/25 1051 07/27/25 1051 07/27/25 1051 07/27/25 1053 07/27/25 1051   97.6 °F (36.4 °C) 77 14 149/81 96 %      Temp src Heart Rate Source Patient Position BP Location FiO2 (%)   07/27/25 1051 07/27/25 1051 07/27/25 1053 07/27/25 1053 --   Oral Monitor Sitting Right arm        Physical Exam    Physical Exam   Constitutional: No distress.  Nontoxic  HENT:  Head: Normocephalic and atraumatic.   Oropharynx: Mucous membranes are moist.   Eyes: . No scleral icterus. No conjunctival pallor.  Neck: Normal range of motion. Neck supple.  No focal tenderness to palpation of the neck identified.  C5-8 motor and sensory grossly intact.  Cardiovascular: Pink warm and well perfused throughout.    Pulmonary/Chest: No respiratory distress.  No tachypnea or increased work of breathing appreciated.    Musculoskeletal: Moves all extremities equally.    Neurological: Alert and oriented.  No acute focal deficit appreciated.  Skin: Skin is pink, warm, and dry.   Psychiatric: Mood and affect normal.   Nursing note and vitals reviewed.             LAB RESULTS  No results found for this or any previous visit (from the past 24 hours).      RADIOLOGY  No Radiology Exams Resulted Within Past 24 Hours      MEDICATIONS GIVEN IN ER  Medications   predniSONE (DELTASONE) tablet 60 mg (60 mg Oral Given 7/27/25 1315)         ORDERS PLACED DURING THIS VISIT:  Orders Placed This Encounter   Procedures    CT Cervical Spine Without Contrast    Monitor Blood Pressure         OUTPATIENT MEDICATION MANAGEMENT:  No current Epic-ordered facility-administered medications on file.     Current Outpatient Medications Ordered in Epic   Medication Sig Dispense Refill    atorvastatin (LIPITOR) 20 MG tablet Take 1 tablet by mouth Daily. 90 tablet 1    clopidogrel (PLAVIX) 75 MG tablet Take 1 tablet by mouth Daily. 90 tablet 1    colesevelam (WELCHOL) 625 MG tablet Take 1 tablet by mouth 3 (Three) Times a  Day.      latanoprost (XALATAN) 0.005 % ophthalmic solution Administer 1 drop to both eyes Daily.      losartan-hydrochlorothiazide (HYZAAR) 100-12.5 MG per tablet Take 1 tablet by mouth Daily. 90 tablet 1    methocarbamol (ROBAXIN) 500 MG tablet Take 1 tablet by mouth 4 (Four) Times a Day As Needed for Muscle Spasms. 15 tablet 0    metoprolol succinate XL (TOPROL-XL) 25 MG 24 hr tablet Take 1 tablet by mouth Daily. Take dos 90 tablet 1    nitroglycerin (Nitrostat) 0.4 MG SL tablet Place 1 tablet under the tongue Every 5 (Five) Minutes As Needed for Chest Pain.      pantoprazole (Protonix) 40 MG EC tablet Take 1 tablet by mouth Daily. 90 tablet 1    predniSONE (DELTASONE) 20 MG tablet 2 tablets by mouth daily for 5 days 10 tablet 0    Scopolamine 1 MG/3DAYS patch Place 1 patch on the skin as directed by provider Every 72 (Seventy-Two) Hours. 4 each 0    Testosterone Cypionate (DEPOTESTOTERONE CYPIONATE) 200 MG/ML injection Inject 0.5 mL into the appropriate muscle as directed by prescriber Every 7 (Seven) Days.  0         PROCEDURES  Procedures            PROGRESS, DATA ANALYSIS, CONSULTS, AND MEDICAL DECISION MAKING  All labs have been independently interpreted by me.  All radiology studies have been reviewed by me. All EKGs have been independently viewed and interpreted by me.  Discussion below represents my analysis of pertinent findings related to patient's condition, differential diagnosis, treatment plan and final disposition.    Differential diagnosis:   My differential includes but is not limited to neck pain, cervical contusion, degenerative disc disease, herniated disc, acute cervical strain, cervical radiculopathy, cervical fracture, torticollis, carotid artery dissection and vertebral artery dissection k    Clinical Scores:                  ED Course as of 07/27/25 1450   Sun Jul 27, 2025   1416 RADIOLOGY      Study: CT cervical spine  Findings: Arthritic change most notably at C5 on C6  I independently  viewed and interpreted these images contemporaneously with treatment.    [RS]   1443 CONSULT        Provider: Dr. Arguelles -neuroradiologist    Discussion: No acute fracture or sublux of the cervical spine.  Chronic changes not significantly changed as compared to MRI earlier this year.    Agreeable c treatment and planned disposition.         [RS]   1450 Reviewed results with patient.  We discussed red flags with indicate need for ED return.  Patient has follow-up with neurology pending but I also recommend that he see neurosurgery.  We discussed medication's risks and benefits.  Patient agreeable discharge planning. [RS]      ED Course User Index  [RS] Kalyan Kerr MD         Prescription drug monitoring program review:     AS OF 14:50 EDT VITALS:    BP - 128/87  HR - 60  TEMP - 97.6 °F (36.4 °C) (Oral)  O2 SATS - 96%    COMPLEXITY OF CARE  Admission was considered but after careful review of the patient's presentation, physical examination, diagnostic results, and response to treatment the patient may be safely discharged with outpatient follow-up.      DIAGNOSIS  Final diagnoses:   Neck pain, acute on chronic   Osteoarthritis of cervical spine, unspecified spinal osteoarthritis complication status         DISPOSITION  ED Disposition       ED Disposition   Discharge    Condition   Stable    Comment   --                  DISCHARGE    Patient discharged in stable condition.    Reviewed implications of results, diagnosis, meds, responsibility to follow up, warning signs and symptoms of possible worsening, potential complications and reasons to return to ER.    Patient/Family voiced understanding of above instructions.    Discussed plan for discharge, as there is no emergent indication for admission. Patient referred to primary care provider for regular health maintenance. Pt/family is agreeable and understands need for follow up and possible repeat testing.  Pt is aware that discharge does not mean that nothing is  wrong but it indicates no emergency is present that requires admission and they must continue care with follow-up as given below or physician of their choice.     FOLLOW-UP  Bradley Lind MD  [address]  [address]  [facility] [id number] [phone]    Schedule an appointment as soon as possible for a visit in 3 days  If symptoms fail to improve    Toni Simmons MD  [address] [address]  [address]  [facility] [id number] [phone]    Call in 1 day  Schedule outpatient follow-up for further evaluation and treatment         Medication List        New Prescriptions      methocarbamol 500 MG tablet  Commonly known as: ROBAXIN  Take 1 tablet by mouth 4 (Four) Times a Day As Needed for Muscle Spasms.     predniSONE 20 MG tablet  Commonly known as: DELTASONE  2 tablets by mouth daily for 5 days               Where to Get Your Medications        These medications were sent to [id number] - [place], [place] - [address] [facility] [phone] [facility] [phone]   [address] [facility] [address]      Phone: [phone]   methocarbamol 500 MG tablet  predniSONE 20 MG tablet           Please note that portions of this document were completed with a voice recognition program.    Note Disclaimer: At [facility], we believe that sharing information builds trust and better relationships. You are receiving this note because you recently visited [facility]. It is possible you will see health information before a provider has talked with you about it. This kind of information can be easy to misunderstand. To help you fully understand what it means for your health, we urge you to discuss this note with your provider.         Kalyan Kerr MD  07/27/25 2627

## 2025-07-29 ENCOUNTER — OFFICE VISIT (OUTPATIENT)
Dept: INTERNAL MEDICINE | Facility: CLINIC | Age: 70
End: 2025-07-29
Payer: COMMERCIAL

## 2025-07-29 VITALS
HEIGHT: 70 IN | DIASTOLIC BLOOD PRESSURE: 74 MMHG | OXYGEN SATURATION: 95 % | BODY MASS INDEX: 32.07 KG/M2 | WEIGHT: 224 LBS | SYSTOLIC BLOOD PRESSURE: 148 MMHG | HEART RATE: 75 BPM

## 2025-07-29 DIAGNOSIS — M50.30 DEGENERATION OF INTERVERTEBRAL DISC OF CERVICAL REGION: Primary | ICD-10-CM

## 2025-07-29 PROCEDURE — 99214 OFFICE O/P EST MOD 30 MIN: CPT | Performed by: STUDENT IN AN ORGANIZED HEALTH CARE EDUCATION/TRAINING PROGRAM

## 2025-07-29 RX ORDER — PREDNISONE 20 MG/1
TABLET ORAL
Qty: 10 TABLET | Refills: 0 | Status: SHIPPED | OUTPATIENT
Start: 2025-07-29

## 2025-07-29 NOTE — PROGRESS NOTES
"Chief Complaint  Hospital Follow Up Visit    Subjective        Dangelo Song presents to [facility] PRIMARY [facility]  History of Present Illness    Presents to clinic today for ER follow-up    He recently was seen in the ER for acute on chronic upper back and neck pain, he reports that he had progressively worsening pain in this area after lifting of some heavy objects as well as missing a step and working in his yard. He reports he has not been able to sleep due to severe pain with sleeping, he had CT scan that showed severe arthritis and was given steroid pack and muscle relaxer. He just started prednisone and reports some improvement but still having issues with sleeping. Muscle relaxer has not helped much      Objective   Vital Signs:  /74   Pulse 75   Ht 177.8 cm (70\")   Wt 102 kg (224 lb)   SpO2 95%   BMI 32.14 kg/m²   Estimated body mass index is 32.14 kg/m² as calculated from the following:    Height as of this encounter: 177.8 cm (70\").    Weight as of this encounter: 102 kg (224 lb).            Physical Exam  Vitals reviewed.   Constitutional:       General: He is not in acute distress.     Appearance: Normal appearance. He is not toxic-appearing.   HENT:      Head: Normocephalic and atraumatic.   Musculoskeletal:      Cervical back: Crepitus present. Pain with movement present. Decreased range of motion.   Skin:     General: Skin is warm and dry.   Neurological:      Mental Status: He is alert and oriented to person, place, and time.   Psychiatric:         Mood and Affect: Mood normal.         Behavior: Behavior normal.        Result Review :                Assessment and Plan   Diagnoses and all orders for this visit:    1. Degeneration of intervertebral disc of cervical region (Primary)  -     predniSONE (DELTASONE) 20 MG tablet; 2 tablets by mouth daily for 5 days  Dispense: 10 tablet; Refill: 0      Reviewed ER visit and CT scan    Unfortunately I worry this is flare up of " his underlying cervical disc disease/arthritis and recommend he establish with NSG to see if any surgical intervention and/or injections would benefit. He has upcoming appt in a little over one week so will extend steroids to reduce inflammation until then as would like to avoid chronic pain medication    RTC as scheduled or sooner prn        Follow Up   No follow-ups on file.  Patient was given instructions and counseling regarding his condition or for health maintenance advice. Please see specific information pulled into the AVS if appropriate.

## 2025-07-30 ENCOUNTER — TELEPHONE (OUTPATIENT)
Dept: INTERNAL MEDICINE | Facility: CLINIC | Age: 70
End: 2025-07-30
Payer: COMMERCIAL

## 2025-07-30 DIAGNOSIS — M50.30 DEGENERATION OF INTERVERTEBRAL DISC OF CERVICAL REGION: Primary | ICD-10-CM

## 2025-07-30 RX ORDER — TRAMADOL HYDROCHLORIDE 50 MG/1
50 TABLET ORAL EVERY 12 HOURS PRN
Qty: 10 TABLET | Refills: 0 | Status: SHIPPED | OUTPATIENT
Start: 2025-07-30

## 2025-08-01 ENCOUNTER — OFFICE VISIT (OUTPATIENT)
Dept: NEUROLOGY | Facility: CLINIC | Age: 70
End: 2025-08-01
Payer: MEDICARE

## 2025-08-01 ENCOUNTER — PATIENT ROUNDING (BHMG ONLY) (OUTPATIENT)
Dept: NEUROLOGY | Facility: CLINIC | Age: 70
End: 2025-08-01
Payer: COMMERCIAL

## 2025-08-01 VITALS
OXYGEN SATURATION: 97 % | BODY MASS INDEX: 32.44 KG/M2 | HEIGHT: 70 IN | DIASTOLIC BLOOD PRESSURE: 82 MMHG | WEIGHT: 226.6 LBS | SYSTOLIC BLOOD PRESSURE: 124 MMHG | HEART RATE: 75 BPM

## 2025-08-01 DIAGNOSIS — M47.12 OSTEOARTHRITIS OF CERVICAL SPINE WITH MYELOPATHY: ICD-10-CM

## 2025-08-01 DIAGNOSIS — G62.9 PERIPHERAL POLYNEUROPATHY: Primary | ICD-10-CM

## 2025-08-01 PROCEDURE — 99215 OFFICE O/P EST HI 40 MIN: CPT | Performed by: PSYCHIATRY & NEUROLOGY

## 2025-08-01 PROCEDURE — 1159F MED LIST DOCD IN RCRD: CPT | Performed by: PSYCHIATRY & NEUROLOGY

## 2025-08-01 PROCEDURE — 3074F SYST BP LT 130 MM HG: CPT | Performed by: PSYCHIATRY & NEUROLOGY

## 2025-08-01 PROCEDURE — 1160F RVW MEDS BY RX/DR IN RCRD: CPT | Performed by: PSYCHIATRY & NEUROLOGY

## 2025-08-01 PROCEDURE — 3079F DIAST BP 80-89 MM HG: CPT | Performed by: PSYCHIATRY & NEUROLOGY

## 2025-08-01 RX ORDER — BACLOFEN 10 MG/1
10 TABLET ORAL 3 TIMES DAILY PRN
Qty: 90 TABLET | Refills: 0 | Status: SHIPPED | OUTPATIENT
Start: 2025-08-01

## 2025-08-07 ENCOUNTER — OFFICE VISIT (OUTPATIENT)
Dept: NEUROSURGERY | Facility: CLINIC | Age: 70
End: 2025-08-07
Payer: COMMERCIAL

## 2025-08-07 VITALS
HEIGHT: 70 IN | BODY MASS INDEX: 31.35 KG/M2 | DIASTOLIC BLOOD PRESSURE: 68 MMHG | RESPIRATION RATE: 16 BRPM | SYSTOLIC BLOOD PRESSURE: 118 MMHG | WEIGHT: 219 LBS | OXYGEN SATURATION: 95 % | HEART RATE: 56 BPM | TEMPERATURE: 98.6 F

## 2025-08-07 DIAGNOSIS — M50.30 DEGENERATIVE DISC DISEASE, CERVICAL: ICD-10-CM

## 2025-08-07 DIAGNOSIS — M47.812 FACET ARTHROPATHY, CERVICAL: ICD-10-CM

## 2025-08-07 DIAGNOSIS — M54.2 NECK PAIN, ACUTE: Primary | ICD-10-CM

## 2025-08-08 ENCOUNTER — HOSPITAL ENCOUNTER (EMERGENCY)
Facility: HOSPITAL | Age: 70
Discharge: HOME | End: 2025-08-08
Attending: INTERNAL MEDICINE
Payer: COMMERCIAL

## 2025-08-08 VITALS
BODY MASS INDEX: 31.15 KG/M2 | HEIGHT: 70 IN | TEMPERATURE: 98 F | RESPIRATION RATE: 16 BRPM | HEART RATE: 90 BPM | OXYGEN SATURATION: 95 % | DIASTOLIC BLOOD PRESSURE: 87 MMHG | WEIGHT: 217.59 LBS | SYSTOLIC BLOOD PRESSURE: 134 MMHG

## 2025-08-08 DIAGNOSIS — R50.9 FEVER, UNSPECIFIED FEVER CAUSE: ICD-10-CM

## 2025-08-08 DIAGNOSIS — R19.7 DIARRHEA, UNSPECIFIED TYPE: Primary | ICD-10-CM

## 2025-08-08 LAB
ADV 40+41 DNA STL QL NAA+NON-PROBE: NOT DETECTED
ALBUMIN SERPL-MCNC: 4.1 G/DL (ref 3.5–5.2)
ALBUMIN/GLOB SERPL: 1.5 G/DL
ALP SERPL-CCNC: 54 U/L (ref 39–117)
ALT SERPL W P-5'-P-CCNC: 20 U/L (ref 1–41)
ANION GAP SERPL CALCULATED.3IONS-SCNC: 14.3 MMOL/L (ref 5–15)
AST SERPL-CCNC: 22 U/L (ref 1–40)
ASTRO TYP 1-8 RNA STL QL NAA+NON-PROBE: NOT DETECTED
BASOPHILS # BLD AUTO: 0.02 10*3/MM3 (ref 0–0.2)
BASOPHILS NFR BLD AUTO: 0.2 % (ref 0–1.5)
BILIRUB SERPL-MCNC: 1.3 MG/DL (ref 0–1.2)
BUN SERPL-MCNC: 19 MG/DL (ref 8–23)
BUN/CREAT SERPL: 13.9 (ref 7–25)
C CAYETANENSIS DNA STL QL NAA+NON-PROBE: NOT DETECTED
C COLI+JEJ+UPSA DNA STL QL NAA+NON-PROBE: DETECTED
CALCIUM SPEC-SCNC: 8.9 MG/DL (ref 8.6–10.5)
CHLORIDE SERPL-SCNC: 100 MMOL/L (ref 98–107)
CO2 SERPL-SCNC: 22.7 MMOL/L (ref 22–29)
CREAT SERPL-MCNC: 1.37 MG/DL (ref 0.76–1.27)
CRYPTOSP DNA STL QL NAA+NON-PROBE: NOT DETECTED
DEPRECATED RDW RBC AUTO: 41.2 FL (ref 37–54)
E HISTOLYT DNA STL QL NAA+NON-PROBE: NOT DETECTED
EAEC PAA PLAS AGGR+AATA ST NAA+NON-PRB: NOT DETECTED
EC STX1+STX2 GENES STL QL NAA+NON-PROBE: NOT DETECTED
EGFRCR SERPLBLD CKD-EPI 2021: 56.2 ML/MIN/1.73
EOSINOPHIL # BLD AUTO: 0.05 10*3/MM3 (ref 0–0.4)
EOSINOPHIL NFR BLD AUTO: 0.5 % (ref 0.3–6.2)
EPEC EAE GENE STL QL NAA+NON-PROBE: NOT DETECTED
ERYTHROCYTE [DISTWIDTH] IN BLOOD BY AUTOMATED COUNT: 12.8 % (ref 12.3–15.4)
ETEC LTA+ST1A+ST1B TOX ST NAA+NON-PROBE: NOT DETECTED
G LAMBLIA DNA STL QL NAA+NON-PROBE: NOT DETECTED
GLOBULIN UR ELPH-MCNC: 2.8 GM/DL
GLUCOSE SERPL-MCNC: 180 MG/DL (ref 65–99)
HCT VFR BLD AUTO: 46.4 % (ref 37.5–51)
HGB BLD-MCNC: 15.8 G/DL (ref 13–17.7)
IMM GRANULOCYTES # BLD AUTO: 0.01 10*3/MM3 (ref 0–0.05)
IMM GRANULOCYTES NFR BLD AUTO: 0.1 % (ref 0–0.5)
LYMPHOCYTES # BLD AUTO: 1.33 10*3/MM3 (ref 0.7–3.1)
LYMPHOCYTES NFR BLD AUTO: 12.1 % (ref 19.6–45.3)
MCH RBC QN AUTO: 29.8 PG (ref 26.6–33)
MCHC RBC AUTO-ENTMCNC: 34.1 G/DL (ref 31.5–35.7)
MCV RBC AUTO: 87.5 FL (ref 79–97)
MONOCYTES # BLD AUTO: 0.83 10*3/MM3 (ref 0.1–0.9)
MONOCYTES NFR BLD AUTO: 7.6 % (ref 5–12)
NEUTROPHILS NFR BLD AUTO: 79.5 % (ref 42.7–76)
NEUTROPHILS NFR BLD AUTO: 8.71 10*3/MM3 (ref 1.7–7)
NOROVIRUS GI+II RNA STL QL NAA+NON-PROBE: NOT DETECTED
P SHIGELLOIDES DNA STL QL NAA+NON-PROBE: NOT DETECTED
PLATELET # BLD AUTO: 136 10*3/MM3 (ref 140–450)
PMV BLD AUTO: 9.5 FL (ref 6–12)
POTASSIUM SERPL-SCNC: 3.8 MMOL/L (ref 3.5–5.2)
PROT SERPL-MCNC: 6.9 G/DL (ref 6–8.5)
RBC # BLD AUTO: 5.3 10*6/MM3 (ref 4.14–5.8)
RVA RNA STL QL NAA+NON-PROBE: NOT DETECTED
S ENT+BONG DNA STL QL NAA+NON-PROBE: NOT DETECTED
SAPO I+II+IV+V RNA STL QL NAA+NON-PROBE: NOT DETECTED
SHIGELLA SP+EIEC IPAH ST NAA+NON-PROBE: NOT DETECTED
SODIUM SERPL-SCNC: 137 MMOL/L (ref 136–145)
V CHOL+PARA+VUL DNA STL QL NAA+NON-PROBE: NOT DETECTED
V CHOLERAE DNA STL QL NAA+NON-PROBE: NOT DETECTED
WBC NRBC COR # BLD AUTO: 10.95 10*3/MM3 (ref 3.4–10.8)
Y ENTEROCOL DNA STL QL NAA+NON-PROBE: NOT DETECTED

## 2025-08-08 PROCEDURE — 87507 IADNA-DNA/RNA PROBE TQ 12-25: CPT | Performed by: INTERNAL MEDICINE

## 2025-08-08 PROCEDURE — 80053 COMPREHEN METABOLIC PANEL: CPT | Performed by: INTERNAL MEDICINE

## 2025-08-08 PROCEDURE — 87081 CULTURE SCREEN ONLY: CPT | Performed by: INTERNAL MEDICINE

## 2025-08-08 PROCEDURE — 99284 EMERGENCY DEPT VISIT MOD MDM: CPT | Performed by: INTERNAL MEDICINE

## 2025-08-08 PROCEDURE — 25810000003 SODIUM CHLORIDE 0.9 % SOLUTION: Performed by: INTERNAL MEDICINE

## 2025-08-08 PROCEDURE — 99283 EMERGENCY DEPT VISIT LOW MDM: CPT | Performed by: INTERNAL MEDICINE

## 2025-08-08 PROCEDURE — 85025 COMPLETE CBC W/AUTO DIFF WBC: CPT | Performed by: INTERNAL MEDICINE

## 2025-08-08 RX ORDER — SODIUM CHLORIDE 0.9 % (FLUSH) 0.9 %
10 SYRINGE (ML) INJECTION AS NEEDED
Status: DISCONTINUED | OUTPATIENT
Start: 2025-08-08 | End: 2025-08-08 | Stop reason: HOSPADM

## 2025-08-08 RX ORDER — SODIUM CHLORIDE 0.9 % (FLUSH) 0.9 %
10 SYRINGE (ML) INJECTION EVERY 12 HOURS SCHEDULED
Status: DISCONTINUED | OUTPATIENT
Start: 2025-08-08 | End: 2025-08-08 | Stop reason: HOSPADM

## 2025-08-08 RX ORDER — AZITHROMYCIN 250 MG/1
1000 TABLET, FILM COATED ORAL ONCE
Qty: 4 TABLET | Refills: 0 | Status: SHIPPED | OUTPATIENT
Start: 2025-08-08 | End: 2025-08-08

## 2025-08-08 RX ADMIN — SODIUM CHLORIDE 1000 ML: 9 INJECTION, SOLUTION INTRAVENOUS at 10:25

## 2025-08-15 ENCOUNTER — OFFICE VISIT (OUTPATIENT)
Dept: INTERNAL MEDICINE | Facility: CLINIC | Age: 70
End: 2025-08-15
Payer: COMMERCIAL

## 2025-08-15 VITALS
HEIGHT: 70 IN | HEART RATE: 80 BPM | SYSTOLIC BLOOD PRESSURE: 110 MMHG | WEIGHT: 222.2 LBS | DIASTOLIC BLOOD PRESSURE: 70 MMHG | BODY MASS INDEX: 31.81 KG/M2

## 2025-08-15 DIAGNOSIS — M50.30 DEGENERATIVE DISC DISEASE, CERVICAL: ICD-10-CM

## 2025-08-15 DIAGNOSIS — M50.30 DEGENERATION OF INTERVERTEBRAL DISC OF CERVICAL REGION: Primary | ICD-10-CM

## 2025-08-15 DIAGNOSIS — E78.5 HYPERLIPIDEMIA, UNSPECIFIED HYPERLIPIDEMIA TYPE: ICD-10-CM

## 2025-08-15 PROCEDURE — 99213 OFFICE O/P EST LOW 20 MIN: CPT | Performed by: STUDENT IN AN ORGANIZED HEALTH CARE EDUCATION/TRAINING PROGRAM

## 2025-08-15 RX ORDER — COLESEVELAM 180 1/1
625 TABLET ORAL 3 TIMES DAILY
Qty: 270 TABLET | Refills: 1 | Status: SHIPPED | OUTPATIENT
Start: 2025-08-15

## (undated) DEVICE — SKIN PREP TRAY 4 COMPARTM TRAY: Brand: MEDLINE INDUSTRIES, INC.

## (undated) DEVICE — ARM SLING II: Brand: DEROYAL

## (undated) DEVICE — Device

## (undated) DEVICE — SUT MNCRYL 4/0 PS2 18 IN

## (undated) DEVICE — ELECTRODE 8227304 5PK PRASS PR 18MM ROHS

## (undated) DEVICE — LINER SURG CANSTR SXN S/RIGD 1500CC

## (undated) DEVICE — PENCL SMOKE/EVAC MEGADYNE TELESCP 10FT

## (undated) DEVICE — ANTIBACTERIAL UNDYED BRAIDED (POLYGLACTIN 910), SYNTHETIC ABSORBABLE SUTURE: Brand: COATED VICRYL

## (undated) DEVICE — SUT SILK 3/0 RB1 CR8 18IN C053D

## (undated) DEVICE — SPONGE,DISSECTOR,K,XRAY,9/16"X1/4",STRL: Brand: MEDLINE

## (undated) DEVICE — GLV SURG BIOGEL LTX PF 7 1/2

## (undated) DEVICE — DRAPE,INSTRUMENT,MAGNETIC,10X16: Brand: MEDLINE

## (undated) DEVICE — HARMONIC FOCUS SHEARS 9CM LENGTH + ADAPTIVE TISSUE TECHNOLOGY FOR USE WITH BLUE HAND PIECE ONLY: Brand: HARMONIC FOCUS

## (undated) DEVICE — GLV SURG SENSICARE PI LF PF 7.5

## (undated) DEVICE — ADHS SKIN SURG TISS VISC PREMIERPRO EXOFIN HI/VISC FAST/DRY

## (undated) DEVICE — PCH INST SURG INVISISHIELD 2PCKT

## (undated) DEVICE — PROBE 8225401 5PK SD-SD BIPOL STIM ROHS

## (undated) DEVICE — TOWEL,OR,DSP,ST,BLUE,STD,4/PK,20PK/CS: Brand: MEDLINE

## (undated) DEVICE — INTENDED USE FOR SURGICAL MARKING ON INTACT SKIN, ALSO PROVIDES A PERMANENT METHOD OF IDENTIFYING OBJECTS IN THE OPERATING ROOM: Brand: WRITESITE® REGULAR TIP SKIN MARKER

## (undated) DEVICE — CVR PROB ULTRASND CIVFLEX GEN/PURP TELESCP/FOLD 5.5X58IN LF

## (undated) DEVICE — NDL HYPO PRECISIONGLIDE/REG 18G 11/2 PNK

## (undated) DEVICE — INTENDED TO BE USED TO OCCLUDE, RETRACT AND IDENTIFY ARTERIES, VEINS, TENDONS AND NERVES IN SURGICAL PROCEDURES: Brand: STERION®  VESSEL LOOP

## (undated) DEVICE — 3M™ IOBAN™ 2 ANTIMICROBIAL INCISE DRAPE 6650EZ: Brand: IOBAN™ 2

## (undated) DEVICE — SUT SILK 3/0 TIES 18IN A184H

## (undated) DEVICE — DISPOSABLE BIPOLAR CODE, 12' (3.66 M): Brand: CONMED

## (undated) DEVICE — RMT NEUROSTIM INSPIRESLEEPREMOTE SLEEP/APNEA MDL/2580

## (undated) DEVICE — PK PROC MAJ 90

## (undated) DEVICE — ANTI-SKID MAT 10"X16": Brand: MEDLINE INDUSTRIES, INC.

## (undated) DEVICE — NDL HYPO PRECISIONGLIDE REG 25G 1 1/2

## (undated) DEVICE — SOL IRR H2O BTL 1000ML STRL

## (undated) DEVICE — SUT SILK 2/0 SH CR8 18IN CR8 C012D

## (undated) DEVICE — KIT,ANTI FOG,W/SPONGE & FLUID,SOFT PACK: Brand: MEDLINE

## (undated) DEVICE — 3M™ STERI-DRAPE™ FLUOROSCOPE DRAPE, 10 PER CARTON / 4 CARTONS PER CASE, 1012: Brand: STERI-DRAPE™

## (undated) DEVICE — CATH IV INSYTE AUTOGARD BLD/CONTRL SHLD 20G 1.16IN

## (undated) DEVICE — CANN O2 ETCO2 FITS ALL CONN CO2 SMPL A/ 7IN DISP LF

## (undated) DEVICE — SYR LL TP 10ML STRL

## (undated) DEVICE — SHEET, ORTHO, SPLIT, STERILE: Brand: MEDLINE